# Patient Record
Sex: MALE | Race: BLACK OR AFRICAN AMERICAN | NOT HISPANIC OR LATINO | Employment: STUDENT | RURAL
[De-identification: names, ages, dates, MRNs, and addresses within clinical notes are randomized per-mention and may not be internally consistent; named-entity substitution may affect disease eponyms.]

---

## 2021-08-23 ENCOUNTER — HOSPITAL ENCOUNTER (EMERGENCY)
Facility: HOSPITAL | Age: 31
Discharge: HOME | End: 2021-08-23
Attending: FAMILY MEDICINE
Payer: MEDICAID

## 2021-08-23 VITALS
BODY MASS INDEX: 20.88 KG/M2 | HEART RATE: 63 BPM | SYSTOLIC BLOOD PRESSURE: 136 MMHG | HEIGHT: 67 IN | WEIGHT: 133 LBS | TEMPERATURE: 98 F | OXYGEN SATURATION: 99 % | RESPIRATION RATE: 16 BRPM | DIASTOLIC BLOOD PRESSURE: 84 MMHG

## 2021-08-23 DIAGNOSIS — L30.9 ECZEMA, UNSPECIFIED TYPE: Primary | ICD-10-CM

## 2021-08-23 PROCEDURE — 96372 THER/PROPH/DIAG INJ SC/IM: CPT

## 2021-08-23 PROCEDURE — 99283 PR EMERGENCY DEPT VISIT,LEVEL III: ICD-10-PCS | Mod: ,,, | Performed by: FAMILY MEDICINE

## 2021-08-23 PROCEDURE — 99284 EMERGENCY DEPT VISIT MOD MDM: CPT

## 2021-08-23 PROCEDURE — 99283 EMERGENCY DEPT VISIT LOW MDM: CPT | Mod: ,,, | Performed by: FAMILY MEDICINE

## 2021-08-23 PROCEDURE — 63600175 PHARM REV CODE 636 W HCPCS: Performed by: FAMILY MEDICINE

## 2021-08-23 RX ORDER — DEXAMETHASONE SODIUM PHOSPHATE 4 MG/ML
8 INJECTION, SOLUTION INTRA-ARTICULAR; INTRALESIONAL; INTRAMUSCULAR; INTRAVENOUS; SOFT TISSUE
Status: COMPLETED | OUTPATIENT
Start: 2021-08-23 | End: 2021-08-23

## 2021-08-23 RX ORDER — HYDROCORTISONE 25 MG/G
OINTMENT TOPICAL 2 TIMES DAILY
Qty: 1 TUBE | Refills: 0 | Status: SHIPPED | OUTPATIENT
Start: 2021-08-23 | End: 2022-10-10

## 2021-08-23 RX ORDER — HYDROXYZINE PAMOATE 25 MG/1
25 CAPSULE ORAL EVERY 6 HOURS PRN
Qty: 20 CAPSULE | Refills: 0 | Status: SHIPPED | OUTPATIENT
Start: 2021-08-23 | End: 2022-10-10

## 2021-08-23 RX ADMIN — DEXAMETHASONE SODIUM PHOSPHATE 8 MG: 4 INJECTION, SOLUTION INTRAMUSCULAR; INTRAVENOUS at 02:08

## 2022-07-05 ENCOUNTER — OFFICE VISIT (OUTPATIENT)
Dept: FAMILY MEDICINE | Facility: CLINIC | Age: 32
End: 2022-07-05
Payer: MEDICAID

## 2022-07-05 VITALS
DIASTOLIC BLOOD PRESSURE: 80 MMHG | OXYGEN SATURATION: 99 % | TEMPERATURE: 98 F | WEIGHT: 135.63 LBS | SYSTOLIC BLOOD PRESSURE: 117 MMHG | HEART RATE: 63 BPM | BODY MASS INDEX: 21.29 KG/M2 | HEIGHT: 67 IN | RESPIRATION RATE: 16 BRPM

## 2022-07-05 DIAGNOSIS — Z72.51 HIGH RISK HETEROSEXUAL BEHAVIOR: Primary | ICD-10-CM

## 2022-07-05 DIAGNOSIS — R36.9 PENILE DISCHARGE: ICD-10-CM

## 2022-07-05 DIAGNOSIS — A54.9 GONORRHEA: ICD-10-CM

## 2022-07-05 DIAGNOSIS — Z11.3 SCREENING EXAMINATION FOR VENEREAL DISEASE: ICD-10-CM

## 2022-07-05 LAB
HIV 1+O+2 AB SERPL QL: NORMAL
SYPHILIS AB INTERPRETATION: REACTIVE

## 2022-07-05 PROCEDURE — 1159F PR MEDICATION LIST DOCUMENTED IN MEDICAL RECORD: ICD-10-PCS | Mod: CPTII,,, | Performed by: NURSE PRACTITIONER

## 2022-07-05 PROCEDURE — 86695 HERPES SIMPLEX 1 & 2 IGG: ICD-10-PCS | Mod: ,,, | Performed by: CLINICAL MEDICAL LABORATORY

## 2022-07-05 PROCEDURE — 96372 THER/PROPH/DIAG INJ SC/IM: CPT | Mod: ,,, | Performed by: NURSE PRACTITIONER

## 2022-07-05 PROCEDURE — 87591 CHLAMYDIA/GONORRHOEAE(GC), PCR: ICD-10-PCS | Mod: ,,, | Performed by: CLINICAL MEDICAL LABORATORY

## 2022-07-05 PROCEDURE — 3008F BODY MASS INDEX DOCD: CPT | Mod: CPTII,,, | Performed by: NURSE PRACTITIONER

## 2022-07-05 PROCEDURE — 99214 OFFICE O/P EST MOD 30 MIN: CPT | Mod: 25,,, | Performed by: NURSE PRACTITIONER

## 2022-07-05 PROCEDURE — 86696 HERPES SIMPLEX 1 & 2 IGG: ICD-10-PCS | Mod: ,,, | Performed by: CLINICAL MEDICAL LABORATORY

## 2022-07-05 PROCEDURE — 3074F SYST BP LT 130 MM HG: CPT | Mod: CPTII,,, | Performed by: NURSE PRACTITIONER

## 2022-07-05 PROCEDURE — 86780 TREPONEMA PALLIDUM (SYPHILIS) ANTIBODY: ICD-10-PCS | Mod: ,,, | Performed by: CLINICAL MEDICAL LABORATORY

## 2022-07-05 PROCEDURE — 86695 HERPES SIMPLEX TYPE 1 TEST: CPT | Mod: ,,, | Performed by: CLINICAL MEDICAL LABORATORY

## 2022-07-05 PROCEDURE — 86780 TREPONEMA PALLIDUM: CPT | Mod: ,,, | Performed by: CLINICAL MEDICAL LABORATORY

## 2022-07-05 PROCEDURE — 86592 RPR: ICD-10-PCS | Mod: ,,, | Performed by: CLINICAL MEDICAL LABORATORY

## 2022-07-05 PROCEDURE — 87491 CHLMYD TRACH DNA AMP PROBE: CPT | Mod: 91,,, | Performed by: CLINICAL MEDICAL LABORATORY

## 2022-07-05 PROCEDURE — 86694 HERPES SIMPLEX 1 & 2 IGM: ICD-10-PCS | Mod: ,,, | Performed by: CLINICAL MEDICAL LABORATORY

## 2022-07-05 PROCEDURE — 86592 SYPHILIS TEST NON-TREP QUAL: CPT | Mod: ,,, | Performed by: CLINICAL MEDICAL LABORATORY

## 2022-07-05 PROCEDURE — 86694 HERPES SIMPLEX NES ANTBDY: CPT | Mod: ,,, | Performed by: CLINICAL MEDICAL LABORATORY

## 2022-07-05 PROCEDURE — 87389 HIV 1 / 2 ANTIBODY: ICD-10-PCS | Mod: ,,, | Performed by: CLINICAL MEDICAL LABORATORY

## 2022-07-05 PROCEDURE — 3008F PR BODY MASS INDEX (BMI) DOCUMENTED: ICD-10-PCS | Mod: CPTII,,, | Performed by: NURSE PRACTITIONER

## 2022-07-05 PROCEDURE — 87661 TRICHOMONAS VAGINALIS BY PCR: ICD-10-PCS | Mod: ,,, | Performed by: CLINICAL MEDICAL LABORATORY

## 2022-07-05 PROCEDURE — 3074F PR MOST RECENT SYSTOLIC BLOOD PRESSURE < 130 MM HG: ICD-10-PCS | Mod: CPTII,,, | Performed by: NURSE PRACTITIONER

## 2022-07-05 PROCEDURE — 87661 TRICHOMONAS VAGINALIS AMPLIF: CPT | Mod: ,,, | Performed by: CLINICAL MEDICAL LABORATORY

## 2022-07-05 PROCEDURE — 87491 CHLAMYDIA/GONORRHOEAE(GC), PCR: ICD-10-PCS | Mod: 91,,, | Performed by: CLINICAL MEDICAL LABORATORY

## 2022-07-05 PROCEDURE — 3079F DIAST BP 80-89 MM HG: CPT | Mod: CPTII,,, | Performed by: NURSE PRACTITIONER

## 2022-07-05 PROCEDURE — 3079F PR MOST RECENT DIASTOLIC BLOOD PRESSURE 80-89 MM HG: ICD-10-PCS | Mod: CPTII,,, | Performed by: NURSE PRACTITIONER

## 2022-07-05 PROCEDURE — 99214 PR OFFICE/OUTPT VISIT, EST, LEVL IV, 30-39 MIN: ICD-10-PCS | Mod: 25,,, | Performed by: NURSE PRACTITIONER

## 2022-07-05 PROCEDURE — 87591 N.GONORRHOEAE DNA AMP PROB: CPT | Mod: ,,, | Performed by: CLINICAL MEDICAL LABORATORY

## 2022-07-05 PROCEDURE — 86696 HERPES SIMPLEX TYPE 2 TEST: CPT | Mod: ,,, | Performed by: CLINICAL MEDICAL LABORATORY

## 2022-07-05 PROCEDURE — 87389 HIV-1 AG W/HIV-1&-2 AB AG IA: CPT | Mod: ,,, | Performed by: CLINICAL MEDICAL LABORATORY

## 2022-07-05 PROCEDURE — 1159F MED LIST DOCD IN RCRD: CPT | Mod: CPTII,,, | Performed by: NURSE PRACTITIONER

## 2022-07-05 PROCEDURE — 96372 PR INJECTION,THERAP/PROPH/DIAG2ST, IM OR SUBCUT: ICD-10-PCS | Mod: ,,, | Performed by: NURSE PRACTITIONER

## 2022-07-05 RX ORDER — CEFTRIAXONE 1 G/1
1 INJECTION, POWDER, FOR SOLUTION INTRAMUSCULAR; INTRAVENOUS
Status: COMPLETED | OUTPATIENT
Start: 2022-07-05 | End: 2022-07-05

## 2022-07-05 RX ORDER — AZITHROMYCIN 500 MG/1
1000 TABLET, FILM COATED ORAL ONCE
Qty: 2 TABLET | Refills: 0 | Status: SHIPPED | OUTPATIENT
Start: 2022-07-05 | End: 2022-07-05

## 2022-07-05 RX ADMIN — CEFTRIAXONE 1 G: 1 INJECTION, POWDER, FOR SOLUTION INTRAMUSCULAR; INTRAVENOUS at 11:07

## 2022-07-05 NOTE — PROGRESS NOTES
Subjective:       Patient ID: Ney Russell is a 31 y.o. male.    Chief Complaint: Penile Discharge (Patient present to clinic with dysuria and penile discharge for over a wk. )    Green penile discharge    Review of Systems   Constitutional: Negative for chills, fatigue and fever.   Genitourinary: Positive for discharge and dysuria. Negative for flank pain, frequency, genital sores and urgency.         Objective:      Physical Exam  Constitutional:       General: He is not in acute distress.     Appearance: Normal appearance. He is not ill-appearing.   Cardiovascular:      Rate and Rhythm: Normal rate and regular rhythm.      Pulses: Normal pulses.      Heart sounds: Normal heart sounds. No murmur heard.  Pulmonary:      Effort: Pulmonary effort is normal.      Breath sounds: Normal breath sounds. No wheezing or rhonchi.   Abdominal:      General: Bowel sounds are normal.      Palpations: Abdomen is soft.      Tenderness: There is no abdominal tenderness. There is no right CVA tenderness, left CVA tenderness, guarding or rebound.   Musculoskeletal:         General: No swelling or tenderness. Normal range of motion.   Skin:     General: Skin is warm and dry.      Capillary Refill: Capillary refill takes less than 2 seconds.      Findings: No lesion or rash.   Neurological:      Mental Status: He is alert and oriented to person, place, and time.   Psychiatric:         Mood and Affect: Mood normal.         Behavior: Behavior normal.         Thought Content: Thought content normal.         Judgment: Judgment normal.         No visits with results within 6 Month(s) from this visit.   Latest known visit with results is:   No results found for any previous visit.      Assessment:       1. High risk heterosexual behavior    2. Penile discharge    3. Screening examination for venereal disease        Plan:   High risk heterosexual behavior  -     Chlamydia/GC, PCR; Future; Expected date: 07/05/2022    Penile discharge  -      HIV 1/2 Ag/Ab (4th Gen); Future; Expected date: 07/05/2022  -     Herpes simplex type 1&2 IgG,Herpes titer; Future; Expected date: 07/05/2022  -     Herpes simplex type 1 & 2 IgM,Herpes IgM; Future; Expected date: 07/05/2022  -     Syphilis Antibody with reflex to RPR; Future; Expected date: 07/05/2022  -     Chlamydia/GC, PCR; Future; Expected date: 07/05/2022  -     Trichomonas vaginalis by PCR; Future; Expected date: 07/05/2022  -     azithromycin (ZITHROMAX) 500 MG tablet; Take 2 tablets (1,000 mg total) by mouth once. for 1 dose  Dispense: 2 tablet; Refill: 0    Screening examination for venereal disease  -     HIV 1/2 Ag/Ab (4th Gen); Future; Expected date: 07/05/2022  -     Herpes simplex type 1&2 IgG,Herpes titer; Future; Expected date: 07/05/2022  -     Herpes simplex type 1 & 2 IgM,Herpes IgM; Future; Expected date: 07/05/2022  -     Syphilis Antibody with reflex to RPR; Future; Expected date: 07/05/2022  -     Chlamydia/GC, PCR; Future; Expected date: 07/05/2022  -     Trichomonas vaginalis by PCR; Future; Expected date: 07/05/2022    Other orders  -     cefTRIAXone injection 1 g

## 2022-07-06 LAB
CHLAMYDIA BY PCR: NEGATIVE
N. GONORRHOEAE (GC) BY PCR: POSITIVE
RPR SER-TITR: ABNORMAL {TITER}

## 2022-07-06 RX ORDER — AZITHROMYCIN 500 MG/1
1000 TABLET, FILM COATED ORAL ONCE
Qty: 2 TABLET | Refills: 0 | Status: SHIPPED | OUTPATIENT
Start: 2022-07-06 | End: 2022-07-06

## 2022-07-06 NOTE — PROGRESS NOTES
Please notify lab I ordered the confirmatory test  and he needs treatment of rocephin 1 gram and I will send in Zithromax PO

## 2022-07-07 LAB
HSV IGM SER QL IA: NEGATIVE
HSV TYPE 1 AB IGG INDEX: 3.84
HSV TYPE 2 AB IGG INDEX: 2.45
HSV1 IGG SER QL: POSITIVE
HSV2 IGG SER QL: POSITIVE
TRICHOMONAS NAT: NEGATIVE

## 2022-07-07 PROCEDURE — 86780 SYPHILIS AB, TP-PA: ICD-10-PCS | Mod: 90,,, | Performed by: CLINICAL MEDICAL LABORATORY

## 2022-07-07 PROCEDURE — 86780 TREPONEMA PALLIDUM: CPT | Mod: 90,,, | Performed by: CLINICAL MEDICAL LABORATORY

## 2022-07-08 ENCOUNTER — TELEPHONE (OUTPATIENT)
Dept: FAMILY MEDICINE | Facility: CLINIC | Age: 32
End: 2022-07-08
Payer: MEDICAID

## 2022-07-08 NOTE — TELEPHONE ENCOUNTER
Unable to reach pt. Fax demographics and results to [facility].----- Message from KADI Zhang sent at 7/6/2022  9:03 AM CDT -----  Please notify lab I ordered the confirmatory test  and he needs treatment of rocephin 1 gram and I will send in Zithromax PO

## 2022-07-11 LAB — T PALLIDUM AB SER QL AGGL: POSITIVE

## 2022-07-19 ENCOUNTER — CLINICAL SUPPORT (OUTPATIENT)
Dept: FAMILY MEDICINE | Facility: CLINIC | Age: 32
End: 2022-07-19
Payer: MEDICAID

## 2022-07-19 DIAGNOSIS — A53.9 SYPHILIS: Primary | ICD-10-CM

## 2022-07-19 PROCEDURE — 96372 THER/PROPH/DIAG INJ SC/IM: CPT | Mod: ,,, | Performed by: NURSE PRACTITIONER

## 2022-07-19 PROCEDURE — 96372 PR INJECTION,THERAP/PROPH/DIAG2ST, IM OR SUBCUT: ICD-10-PCS | Mod: ,,, | Performed by: NURSE PRACTITIONER

## 2022-07-19 NOTE — PROGRESS NOTES
Pt RTC for results. Treatment was needed for positive Syphilis. Instructed to RTC x 3 weeks of 2.4 Bicillin per Danna Verde's order. Voiced understanding and had no further questions.

## 2022-07-26 ENCOUNTER — OFFICE VISIT (OUTPATIENT)
Dept: FAMILY MEDICINE | Facility: CLINIC | Age: 32
End: 2022-07-26
Payer: MEDICAID

## 2022-07-26 VITALS
HEIGHT: 66 IN | RESPIRATION RATE: 18 BRPM | BODY MASS INDEX: 21.38 KG/M2 | HEART RATE: 68 BPM | SYSTOLIC BLOOD PRESSURE: 148 MMHG | OXYGEN SATURATION: 98 % | WEIGHT: 133 LBS | DIASTOLIC BLOOD PRESSURE: 90 MMHG | TEMPERATURE: 98 F

## 2022-07-26 DIAGNOSIS — A53.9 SYPHILIS: Primary | ICD-10-CM

## 2022-07-26 PROCEDURE — 3077F PR MOST RECENT SYSTOLIC BLOOD PRESSURE >= 140 MM HG: ICD-10-PCS | Mod: CPTII,,, | Performed by: FAMILY MEDICINE

## 2022-07-26 PROCEDURE — 3080F PR MOST RECENT DIASTOLIC BLOOD PRESSURE >= 90 MM HG: ICD-10-PCS | Mod: CPTII,,, | Performed by: FAMILY MEDICINE

## 2022-07-26 PROCEDURE — 96372 THER/PROPH/DIAG INJ SC/IM: CPT | Mod: ,,, | Performed by: FAMILY MEDICINE

## 2022-07-26 PROCEDURE — 99213 PR OFFICE/OUTPT VISIT, EST, LEVL III, 20-29 MIN: ICD-10-PCS | Mod: 25,,, | Performed by: FAMILY MEDICINE

## 2022-07-26 PROCEDURE — 3008F PR BODY MASS INDEX (BMI) DOCUMENTED: ICD-10-PCS | Mod: CPTII,,, | Performed by: FAMILY MEDICINE

## 2022-07-26 PROCEDURE — 99213 OFFICE O/P EST LOW 20 MIN: CPT | Mod: 25,,, | Performed by: FAMILY MEDICINE

## 2022-07-26 PROCEDURE — 1159F PR MEDICATION LIST DOCUMENTED IN MEDICAL RECORD: ICD-10-PCS | Mod: CPTII,,, | Performed by: FAMILY MEDICINE

## 2022-07-26 PROCEDURE — 1159F MED LIST DOCD IN RCRD: CPT | Mod: CPTII,,, | Performed by: FAMILY MEDICINE

## 2022-07-26 PROCEDURE — 96372 PR INJECTION,THERAP/PROPH/DIAG2ST, IM OR SUBCUT: ICD-10-PCS | Mod: ,,, | Performed by: FAMILY MEDICINE

## 2022-07-26 PROCEDURE — 3077F SYST BP >= 140 MM HG: CPT | Mod: CPTII,,, | Performed by: FAMILY MEDICINE

## 2022-07-26 PROCEDURE — 3080F DIAST BP >= 90 MM HG: CPT | Mod: CPTII,,, | Performed by: FAMILY MEDICINE

## 2022-07-26 PROCEDURE — 3008F BODY MASS INDEX DOCD: CPT | Mod: CPTII,,, | Performed by: FAMILY MEDICINE

## 2022-07-26 NOTE — PROGRESS NOTES
Subjective:       Patient ID: Ney Russell is a 31 y.o. male.    Chief Complaint: No chief complaint on file.    In for Bicillin injection.  He receive 1 injection last week.    Review of Systems      Objective:      Physical Exam    Assessment:       Problem List Items Addressed This Visit    None         Plan:         return in 1 week for repeat Bicillin injection.  Repeat titer in 6 weeks

## 2022-08-09 ENCOUNTER — CLINICAL SUPPORT (OUTPATIENT)
Dept: FAMILY MEDICINE | Facility: CLINIC | Age: 32
End: 2022-08-09
Payer: MEDICAID

## 2022-08-09 DIAGNOSIS — A53.9 SYPHILIS: Primary | ICD-10-CM

## 2022-08-09 PROCEDURE — 96372 PR INJECTION,THERAP/PROPH/DIAG2ST, IM OR SUBCUT: ICD-10-PCS | Mod: ,,, | Performed by: FAMILY MEDICINE

## 2022-08-09 PROCEDURE — 96372 THER/PROPH/DIAG INJ SC/IM: CPT | Mod: ,,, | Performed by: FAMILY MEDICINE

## 2022-08-12 ENCOUNTER — HOSPITAL ENCOUNTER (OUTPATIENT)
Dept: RADIOLOGY | Facility: HOSPITAL | Age: 32
Discharge: HOME | End: 2022-08-12
Attending: NURSE PRACTITIONER
Payer: MEDICAID

## 2022-08-12 ENCOUNTER — OFFICE VISIT (OUTPATIENT)
Dept: PRIMARY CARE CLINIC | Facility: CLINIC | Age: 32
End: 2022-08-12
Payer: COMMERCIAL

## 2022-08-12 VITALS
HEIGHT: 66 IN | HEART RATE: 69 BPM | BODY MASS INDEX: 21.38 KG/M2 | SYSTOLIC BLOOD PRESSURE: 127 MMHG | DIASTOLIC BLOOD PRESSURE: 75 MMHG | WEIGHT: 133 LBS | OXYGEN SATURATION: 98 % | TEMPERATURE: 98 F | RESPIRATION RATE: 16 BRPM

## 2022-08-12 DIAGNOSIS — L25.3 CHEMICAL DERMATITIS: ICD-10-CM

## 2022-08-12 DIAGNOSIS — S61.219A LACERATION OF FINGER OF LEFT HAND, INITIAL ENCOUNTER: Primary | ICD-10-CM

## 2022-08-12 PROCEDURE — 99213 OFFICE O/P EST LOW 20 MIN: CPT | Mod: ,,, | Performed by: NURSE PRACTITIONER

## 2022-08-12 PROCEDURE — 73140 X-RAY EXAM OF FINGER(S): CPT | Mod: TC,LT

## 2022-08-12 PROCEDURE — 99213 PR OFFICE/OUTPT VISIT, EST, LEVL III, 20-29 MIN: ICD-10-PCS | Mod: ,,, | Performed by: NURSE PRACTITIONER

## 2022-08-12 PROCEDURE — 73140 XR FINGER 2 OR MORE VIEWS LEFT: ICD-10-PCS | Mod: 26,LT,, | Performed by: RADIOLOGY

## 2022-08-12 PROCEDURE — 73140 X-RAY EXAM OF FINGER(S): CPT | Mod: 26,LT,, | Performed by: RADIOLOGY

## 2022-08-12 RX ORDER — TRIAMCINOLONE ACETONIDE 0.25 MG/G
CREAM TOPICAL 2 TIMES DAILY
Qty: 80 G | Refills: 0 | Status: SHIPPED | OUTPATIENT
Start: 2022-08-12 | End: 2022-11-11
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
## 2022-08-24 ENCOUNTER — OFFICE VISIT (OUTPATIENT)
Dept: PRIMARY CARE CLINIC | Facility: CLINIC | Age: 32
End: 2022-08-24
Payer: COMMERCIAL

## 2022-08-24 VITALS
WEIGHT: 133 LBS | SYSTOLIC BLOOD PRESSURE: 122 MMHG | TEMPERATURE: 99 F | DIASTOLIC BLOOD PRESSURE: 78 MMHG | OXYGEN SATURATION: 99 % | RESPIRATION RATE: 18 BRPM | BODY MASS INDEX: 21.38 KG/M2 | HEIGHT: 66 IN | HEART RATE: 60 BPM

## 2022-08-24 DIAGNOSIS — L25.3 CHEMICAL DERMATITIS: Primary | ICD-10-CM

## 2022-08-24 DIAGNOSIS — S61.213A LACERATION OF LEFT MIDDLE FINGER WITHOUT FOREIGN BODY WITHOUT DAMAGE TO NAIL: Primary | ICD-10-CM

## 2022-08-24 DIAGNOSIS — S61.219D LACERATION OF FINGER OF LEFT HAND, SUBSEQUENT ENCOUNTER: ICD-10-CM

## 2022-08-24 PROCEDURE — 99213 OFFICE O/P EST LOW 20 MIN: CPT | Mod: ,,, | Performed by: NURSE PRACTITIONER

## 2022-08-24 PROCEDURE — 99213 PR OFFICE/OUTPT VISIT, EST, LEVL III, 20-29 MIN: ICD-10-PCS | Mod: ,,, | Performed by: NURSE PRACTITIONER
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
## 2022-10-10 ENCOUNTER — OFFICE VISIT (OUTPATIENT)
Dept: PRIMARY CARE CLINIC | Facility: CLINIC | Age: 32
End: 2022-10-10
Payer: COMMERCIAL

## 2022-10-10 VITALS
DIASTOLIC BLOOD PRESSURE: 82 MMHG | TEMPERATURE: 98 F | WEIGHT: 133 LBS | HEIGHT: 66 IN | HEART RATE: 60 BPM | OXYGEN SATURATION: 98 % | SYSTOLIC BLOOD PRESSURE: 132 MMHG | BODY MASS INDEX: 21.38 KG/M2 | RESPIRATION RATE: 18 BRPM

## 2022-10-10 DIAGNOSIS — R21 RASH: ICD-10-CM

## 2022-10-10 DIAGNOSIS — S61.219D LACERATION OF FINGER OF LEFT HAND, SUBSEQUENT ENCOUNTER: Primary | ICD-10-CM

## 2022-10-10 PROCEDURE — 99213 OFFICE O/P EST LOW 20 MIN: CPT | Mod: ,,, | Performed by: NURSE PRACTITIONER

## 2022-10-10 PROCEDURE — 99213 PR OFFICE/OUTPT VISIT, EST, LEVL III, 20-29 MIN: ICD-10-PCS | Mod: ,,, | Performed by: NURSE PRACTITIONER

## 2022-10-10 RX ORDER — HYDROCORTISONE 25 MG/G
CREAM TOPICAL 2 TIMES DAILY
Qty: 20 G | Refills: 0 | Status: SHIPPED | OUTPATIENT
Start: 2022-10-10 | End: 2022-11-11
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
## 2022-11-11 ENCOUNTER — OFFICE VISIT (OUTPATIENT)
Dept: FAMILY MEDICINE | Facility: CLINIC | Age: 32
End: 2022-11-11

## 2022-11-11 VITALS
DIASTOLIC BLOOD PRESSURE: 95 MMHG | HEART RATE: 64 BPM | BODY MASS INDEX: 21.69 KG/M2 | HEIGHT: 66 IN | WEIGHT: 135 LBS | RESPIRATION RATE: 17 BRPM | SYSTOLIC BLOOD PRESSURE: 121 MMHG | OXYGEN SATURATION: 100 % | TEMPERATURE: 98 F

## 2022-11-11 DIAGNOSIS — B35.4 TINEA CORPORIS: ICD-10-CM

## 2022-11-11 DIAGNOSIS — B35.0 TINEA CAPITIS: Primary | ICD-10-CM

## 2022-11-11 PROCEDURE — 99213 PR OFFICE/OUTPT VISIT, EST, LEVL III, 20-29 MIN: ICD-10-PCS | Mod: ,,, | Performed by: NURSE PRACTITIONER

## 2022-11-11 PROCEDURE — 99213 OFFICE O/P EST LOW 20 MIN: CPT | Mod: ,,, | Performed by: NURSE PRACTITIONER

## 2022-11-11 RX ORDER — TERBINAFINE HYDROCHLORIDE 250 MG/1
250 TABLET ORAL DAILY
Qty: 30 TABLET | Refills: 0 | Status: SHIPPED | OUTPATIENT
Start: 2022-11-11 | End: 2022-12-11

## 2022-11-11 RX ORDER — HYDROXYZINE PAMOATE 25 MG/1
25 CAPSULE ORAL EVERY 4 HOURS PRN
Qty: 30 CAPSULE | Refills: 0 | Status: SHIPPED | OUTPATIENT
Start: 2022-11-11

## 2022-11-11 NOTE — PROGRESS NOTES
Subjective:       Patient ID: Ney Russell is a 31 y.o. male.    Chief Complaint: Rash (Allergic reaction. Rash all over. Second this happen.)    Widespread rash over body and scalp- hx of the same    This is a recurring problem. The current episode started 1 week ago. The problem has been gradually worsening since onset. He states that it started on his scalp and made its way down the body. The affected locations include the scalp, chest, back, left arm, right arm, left upper leg, left buttock, left elbow, right elbow, right upper leg, right buttock, left axilla and right axilla. The rash is characterized by dryness, scaling, peeling and itchiness. He was exposed to nothing. Pertinent negatives include no congestion, cough, diarrhea, fatigue, fever, nail changes, rhinorrhea, shortness of breath, sore throat or vomiting. Past treatments include hydrocortisone, vistaril, terbinafine and steroids that helped in the past.    Rash  This is a new problem. The current episode started in the past 7 days. The problem has been gradually worsening since onset. The affected locations include the scalp, head, abdomen, right arm and left arm. Pertinent negatives include no anorexia, congestion, cough, diarrhea, eye pain, facial edema, fatigue, fever, joint pain, nail changes, rhinorrhea, shortness of breath, sore throat or vomiting. Past treatments include topical steroids and anti-itch cream. The treatment provided no relief. His past medical history is significant for eczema. There is no history of allergies, asthma or varicella.   Review of Systems   Constitutional:  Negative for appetite change, chills, fatigue and fever.   HENT:  Negative for nasal congestion, ear pain, rhinorrhea and sore throat.    Eyes:  Negative for pain, discharge and itching.   Respiratory:  Negative for cough and shortness of breath.    Cardiovascular:  Negative for chest pain and leg swelling.   Gastrointestinal:  Negative for abdominal pain,  anorexia, change in bowel habit, diarrhea, nausea, vomiting and change in bowel habit.   Genitourinary:  Negative for dysuria, flank pain, frequency, genital sores and urgency.   Musculoskeletal:  Negative for back pain, gait problem, joint pain and neck pain.   Integumentary:  Positive for rash. Negative for nail changes and wound.   Neurological:  Negative for dizziness, weakness and headaches.   All other systems reviewed and are negative.      Objective:      Physical Exam  Vitals and nursing note reviewed.   Constitutional:       General: He is not in acute distress.     Appearance: Normal appearance. He is not ill-appearing, toxic-appearing or diaphoretic.   HENT:      Head: Normocephalic.      Mouth/Throat:      Mouth: Mucous membranes are moist.   Eyes:      General: No scleral icterus.        Right eye: No discharge.         Left eye: No discharge.      Extraocular Movements: Extraocular movements intact.      Pupils: Pupils are equal, round, and reactive to light.   Cardiovascular:      Rate and Rhythm: Normal rate and regular rhythm.      Pulses: Normal pulses.      Heart sounds: Normal heart sounds. No murmur heard.  Pulmonary:      Effort: Pulmonary effort is normal. No respiratory distress.      Breath sounds: Normal breath sounds. No wheezing, rhonchi or rales.   Musculoskeletal:         General: Normal range of motion.      Cervical back: Neck supple. No tenderness.   Lymphadenopathy:      Cervical: No cervical adenopathy.   Skin:     General: Skin is warm and dry.      Capillary Refill: Capillary refill takes less than 2 seconds.      Findings: Rash present. Rash is scaling.      Comments: Multiple flat, dry, scaling, pruritic lesions noted to trunks, scalp, face arms and legs   Neurological:      Mental Status: He is alert and oriented to person, place, and time.   Psychiatric:         Mood and Affect: Mood normal.         Behavior: Behavior normal.         Thought Content: Thought content normal.          Judgment: Judgment normal.          Assessment:       1. Tinea capitis    2. Tinea corporis        Plan:   Tinea capitis  -     terbinafine HCL (LAMISIL) 250 mg tablet; Take 1 tablet (250 mg total) by mouth once daily.  Dispense: 30 tablet; Refill: 0  -     hydrOXYzine pamoate (VISTARIL) 25 MG Cap; Take 1 capsule (25 mg total) by mouth every 4 (four) hours as needed (itching).  Dispense: 30 capsule; Refill: 0    Tinea corporis  -     terbinafine HCL (LAMISIL) 250 mg tablet; Take 1 tablet (250 mg total) by mouth once daily.  Dispense: 30 tablet; Refill: 0  -     hydrOXYzine pamoate (VISTARIL) 25 MG Cap; Take 1 capsule (25 mg total) by mouth every 4 (four) hours as needed (itching).  Dispense: 30 capsule; Refill: 0  If symptoms persists I will refer to derm

## 2023-04-19 ENCOUNTER — PATIENT MESSAGE (OUTPATIENT)
Dept: RESEARCH | Facility: HOSPITAL | Age: 33
End: 2023-04-19

## 2024-01-15 ENCOUNTER — OFFICE VISIT (OUTPATIENT)
Dept: FAMILY MEDICINE | Facility: CLINIC | Age: 34
End: 2024-01-15

## 2024-01-15 VITALS
SYSTOLIC BLOOD PRESSURE: 118 MMHG | TEMPERATURE: 98 F | HEART RATE: 63 BPM | OXYGEN SATURATION: 93 % | HEIGHT: 66 IN | BODY MASS INDEX: 23.46 KG/M2 | DIASTOLIC BLOOD PRESSURE: 80 MMHG | WEIGHT: 146 LBS | RESPIRATION RATE: 18 BRPM

## 2024-01-15 DIAGNOSIS — Z11.3 SCREENING EXAMINATION FOR VENEREAL DISEASE: Primary | ICD-10-CM

## 2024-01-15 DIAGNOSIS — B00.9 HSV-2 (HERPES SIMPLEX VIRUS 2) INFECTION: ICD-10-CM

## 2024-01-15 DIAGNOSIS — R36.9 PENILE DISCHARGE: ICD-10-CM

## 2024-01-15 LAB
HAV IGM SER QL: NORMAL
HBV CORE IGM SER QL: NORMAL
HBV SURFACE AG SERPL QL IA: NORMAL
HCV AB SER QL: NORMAL
HIV 1+O+2 AB SERPL QL: NORMAL
RPR SER-TITR: NORMAL {TITER}
SYPHILIS AB INTERPRETATION: REACTIVE

## 2024-01-15 PROCEDURE — 96372 THER/PROPH/DIAG INJ SC/IM: CPT | Mod: ,,, | Performed by: NURSE PRACTITIONER

## 2024-01-15 PROCEDURE — 80074 ACUTE HEPATITIS PANEL: CPT | Mod: ,,, | Performed by: CLINICAL MEDICAL LABORATORY

## 2024-01-15 PROCEDURE — 87591 N.GONORRHOEAE DNA AMP PROB: CPT | Mod: ,,, | Performed by: CLINICAL MEDICAL LABORATORY

## 2024-01-15 PROCEDURE — 86780 TREPONEMA PALLIDUM: CPT | Mod: ,,, | Performed by: CLINICAL MEDICAL LABORATORY

## 2024-01-15 PROCEDURE — 87661 TRICHOMONAS VAGINALIS AMPLIF: CPT | Mod: ,,, | Performed by: CLINICAL MEDICAL LABORATORY

## 2024-01-15 PROCEDURE — 99214 OFFICE O/P EST MOD 30 MIN: CPT | Mod: 25,,, | Performed by: NURSE PRACTITIONER

## 2024-01-15 PROCEDURE — 86592 SYPHILIS TEST NON-TREP QUAL: CPT | Mod: ,,, | Performed by: CLINICAL MEDICAL LABORATORY

## 2024-01-15 PROCEDURE — 87389 HIV-1 AG W/HIV-1&-2 AB AG IA: CPT | Mod: ,,, | Performed by: CLINICAL MEDICAL LABORATORY

## 2024-01-15 PROCEDURE — 87491 CHLMYD TRACH DNA AMP PROBE: CPT | Mod: ,,, | Performed by: CLINICAL MEDICAL LABORATORY

## 2024-01-15 RX ORDER — AZITHROMYCIN 500 MG/1
1000 TABLET, FILM COATED ORAL ONCE
Qty: 2 TABLET | Refills: 0 | Status: SHIPPED | OUTPATIENT
Start: 2024-01-15 | End: 2024-01-15

## 2024-01-15 RX ORDER — CEFTRIAXONE 1 G/1
1 INJECTION, POWDER, FOR SOLUTION INTRAMUSCULAR; INTRAVENOUS
Status: COMPLETED | OUTPATIENT
Start: 2024-01-15 | End: 2024-01-15

## 2024-01-15 RX ORDER — VALACYCLOVIR HYDROCHLORIDE 1 G/1
1000 TABLET, FILM COATED ORAL 2 TIMES DAILY
Qty: 60 TABLET | Refills: 11 | Status: SHIPPED | OUTPATIENT
Start: 2024-01-15 | End: 2025-01-14

## 2024-01-15 RX ADMIN — CEFTRIAXONE 1 G: 1 INJECTION, POWDER, FOR SOLUTION INTRAMUSCULAR; INTRAVENOUS at 02:01

## 2024-01-15 NOTE — PROGRESS NOTES
Subjective:       Patient ID: Ney Russell is a 33 y.o. male.    Chief Complaint: Penile Discharge (Pt. States discharge started 1/8/24)    Penile Discharge (Pt. States discharge started 1/8/24)  Genital rash- hx of HSV 1&2- needs valtrex refilled      Penile Discharge  The patient's primary symptoms include penile discharge. Pertinent negatives include no chills, dysuria, fever, flank pain, frequency or urgency.     Review of Systems   Constitutional:  Negative for chills, fatigue and fever.   Genitourinary:  Positive for discharge and genital sores. Negative for dysuria, flank pain, frequency and urgency.         Objective:      Physical Exam  Vitals and nursing note reviewed.   Constitutional:       General: He is not in acute distress.     Appearance: Normal appearance. He is not ill-appearing, toxic-appearing or diaphoretic.   HENT:      Head: Normocephalic.   Eyes:      General: No scleral icterus.     Extraocular Movements: Extraocular movements intact.      Pupils: Pupils are equal, round, and reactive to light.   Cardiovascular:      Rate and Rhythm: Normal rate and regular rhythm.      Pulses: Normal pulses.      Heart sounds: Normal heart sounds. No murmur heard.  Pulmonary:      Effort: Pulmonary effort is normal. No respiratory distress.      Breath sounds: Normal breath sounds. No wheezing, rhonchi or rales.   Abdominal:      General: Bowel sounds are normal.      Palpations: Abdomen is soft.      Tenderness: There is no abdominal tenderness. There is no right CVA tenderness, left CVA tenderness, guarding or rebound.   Musculoskeletal:         General: Normal range of motion.      Cervical back: Neck supple.   Skin:     General: Skin is warm and dry.      Capillary Refill: Capillary refill takes less than 2 seconds.      Findings: Rash present. No lesion.      Comments: Multiple erythematous lesion in the genital area- hx of HSV 1&2   Neurological:      Mental Status: He is alert and oriented to  person, place, and time.   Psychiatric:         Mood and Affect: Mood normal.         Behavior: Behavior normal.         Thought Content: Thought content normal.         Judgment: Judgment normal.          Assessment:       1. Screening examination for venereal disease    2. Penile discharge    3. HSV-2 (herpes simplex virus 2) infection        Plan:   Screening examination for venereal disease  -     HIV 1/2 Ag/Ab (4th Gen); Future; Expected date: 01/15/2024  -     Hepatitis panel, acute; Future; Expected date: 01/15/2024  -     Syphilis Antibody with reflex to RPR; Future; Expected date: 01/15/2024  -     Trichomonas vaginalis by PCR; Future; Expected date: 01/15/2024  -     Chlamydia/GC, PCR; Future; Expected date: 01/15/2024  -     POCT URINALYSIS W/O SCOPE    Penile discharge  -     cefTRIAXone injection 1 g  -     azithromycin (ZITHROMAX) 500 MG tablet; Take 2 tablets (1,000 mg total) by mouth once. for 1 dose  Dispense: 2 tablet; Refill: 0    HSV-2 (herpes simplex virus 2) infection  -     valACYclovir (VALTREX) 1000 MG tablet; Take 1 tablet (1,000 mg total) by mouth 2 (two) times daily.  Dispense: 60 tablet; Refill: 11           Risks, benefits, and side effects were discussed with the patient. All questions were answered to the fullest satisfaction of the patient, and pt verbalized understanding and agreement to treatment plan. Pt was to call with any new or worsening symptoms, or present to the ER

## 2024-01-17 LAB
CHLAMYDIA BY PCR: NEGATIVE
N. GONORRHOEAE (GC) BY PCR: POSITIVE
TRICHOMONAS NAT: NEGATIVE

## 2024-04-22 ENCOUNTER — HOSPITAL ENCOUNTER (EMERGENCY)
Facility: HOSPITAL | Age: 34
Discharge: HOME | End: 2024-04-22

## 2024-04-22 VITALS
OXYGEN SATURATION: 97 % | RESPIRATION RATE: 18 BRPM | BODY MASS INDEX: 21.03 KG/M2 | TEMPERATURE: 98 F | HEIGHT: 67 IN | WEIGHT: 134 LBS | DIASTOLIC BLOOD PRESSURE: 84 MMHG | HEART RATE: 55 BPM | SYSTOLIC BLOOD PRESSURE: 137 MMHG

## 2024-04-22 DIAGNOSIS — J20.9 ACUTE BRONCHITIS, UNSPECIFIED ORGANISM: Primary | ICD-10-CM

## 2024-04-22 DIAGNOSIS — R05.9 COUGH: ICD-10-CM

## 2024-04-22 PROCEDURE — 99284 EMERGENCY DEPT VISIT MOD MDM: CPT | Mod: ,,, | Performed by: NURSE PRACTITIONER

## 2024-04-22 PROCEDURE — 63600175 PHARM REV CODE 636 W HCPCS: Performed by: NURSE PRACTITIONER

## 2024-04-22 PROCEDURE — 96372 THER/PROPH/DIAG INJ SC/IM: CPT | Performed by: NURSE PRACTITIONER

## 2024-04-22 PROCEDURE — 25000242 PHARM REV CODE 250 ALT 637 W/ HCPCS: Performed by: NURSE PRACTITIONER

## 2024-04-22 PROCEDURE — 99284 EMERGENCY DEPT VISIT MOD MDM: CPT | Mod: 25

## 2024-04-22 RX ORDER — ALBUTEROL SULFATE 90 UG/1
4 AEROSOL, METERED RESPIRATORY (INHALATION)
Status: COMPLETED | OUTPATIENT
Start: 2024-04-22 | End: 2024-04-22

## 2024-04-22 RX ORDER — PREDNISONE 50 MG/1
50 TABLET ORAL DAILY
Qty: 5 TABLET | Refills: 0 | Status: SHIPPED | OUTPATIENT
Start: 2024-04-22 | End: 2024-04-27

## 2024-04-22 RX ORDER — ALBUTEROL SULFATE 90 UG/1
2 AEROSOL, METERED RESPIRATORY (INHALATION) EVERY 4 HOURS PRN
Qty: 8 G | Refills: 0 | Status: SHIPPED | OUTPATIENT
Start: 2024-04-22

## 2024-04-22 RX ORDER — DEXAMETHASONE SODIUM PHOSPHATE 4 MG/ML
8 INJECTION, SOLUTION INTRA-ARTICULAR; INTRALESIONAL; INTRAMUSCULAR; INTRAVENOUS; SOFT TISSUE
Status: COMPLETED | OUTPATIENT
Start: 2024-04-22 | End: 2024-04-22

## 2024-04-22 RX ADMIN — DEXAMETHASONE SODIUM PHOSPHATE 8 MG: 4 INJECTION, SOLUTION INTRA-ARTICULAR; INTRALESIONAL; INTRAMUSCULAR; INTRAVENOUS; SOFT TISSUE at 02:04

## 2024-04-22 RX ADMIN — ALBUTEROL SULFATE 4 PUFF: 108 INHALANT RESPIRATORY (INHALATION) at 01:04

## 2024-04-22 NOTE — ED TRIAGE NOTES
Presents to ED for complaints of shortness of breath for 3 weeks.  Patient reports coughing when he takes a deep breath.  Patient reports that he works in cold temperatures at the [employer].

## 2024-04-22 NOTE — DISCHARGE INSTRUCTIONS
Take prednisone daily as directed.  Use your albuterol inhaler 2 puffs every 4 hours for the next 2 days then every 4-6 hours as needed.  Follow up with your primary care provider in 2 days. Return to the emergency department for any increase in symptoms or for any other new or worrisome symptoms.

## 2024-04-22 NOTE — ED PROVIDER NOTES
Encounter Date: 4/22/2024       History     Chief Complaint   Patient presents with    Shortness of Breath     33-year-old male presents to the emergency department to be evaluated for shortness of breath and cough that began 3 weeks ago.    The history is provided by the patient.   Shortness of Breath  This is a new problem. Associated symptoms include cough and wheezing. Pertinent negatives include no fever, no headaches, no rhinorrhea, no sore throat, no swollen glands, no ear pain, no neck pain, no hemoptysis, no PND, no orthopnea, no chest pain, no syncope, no vomiting, no abdominal pain, no rash, no leg pain, no leg swelling and no claudication.     Review of patient's allergies indicates:  No Known Allergies  No past medical history on file.  No past surgical history on file.  No family history on file.  Social History     Tobacco Use    Smoking status: Never    Smokeless tobacco: Never     Review of Systems   Constitutional:  Negative for chills and fever.   HENT:  Negative for ear pain, rhinorrhea and sore throat.    Respiratory:  Positive for cough, shortness of breath and wheezing. Negative for hemoptysis.    Cardiovascular:  Negative for chest pain, orthopnea, claudication, leg swelling, syncope and PND.   Gastrointestinal:  Negative for abdominal pain and vomiting.   Musculoskeletal:  Negative for neck pain.   Skin:  Negative for rash.   Neurological:  Negative for headaches.   All other systems reviewed and are negative.      Physical Exam     Initial Vitals [04/22/24 1120]   BP Pulse Resp Temp SpO2   137/84 67 18 98.3 °F (36.8 °C) 99 %      MAP       --         Physical Exam    Vitals reviewed.  Constitutional: He appears well-developed and well-nourished.   Neck: Neck supple.   Cardiovascular:  Normal rate and regular rhythm.           Pulmonary/Chest: He has wheezes (Minimal expiratory wheeze).   Abdominal: Abdomen is soft. Bowel sounds are normal. He exhibits no distension and no mass. There is no  abdominal tenderness. There is no rebound and no guarding.   Musculoskeletal:         General: Normal range of motion.      Cervical back: Neck supple.     Neurological: He is alert and oriented to person, place, and time. He has normal strength. GCS score is 15. GCS eye subscore is 4. GCS verbal subscore is 5. GCS motor subscore is 6.   Skin: Skin is warm and dry. Capillary refill takes less than 2 seconds.   Psychiatric: He has a normal mood and affect.         Medical Screening Exam   See Full Note    ED Course   Procedures  Labs Reviewed - No data to display       Imaging Results              X-Ray Chest PA And Lateral (Final result)  Result time 04/22/24 12:20:25      Final result by Roshan Dougherty II, MD (04/22/24 12:20:25)                   Impression:      No evidence of cardiopulmonary disease.      Electronically signed by: Roshan Dougherty  Date:    04/22/2024  Time:    12:20               Narrative:    EXAMINATION:  XR CHEST PA AND LATERAL    CLINICAL HISTORY:  Cough, unspecified    COMPARISON:  None available    TECHNIQUE:  XR CHEST PA AND LATERAL    FINDINGS:  The heart and mediastinum are normal in size and configuration.  The pulmonary vascularity is normal in caliber.  No lung infiltrates, effusions, pneumothorax or other abnormality is demonstrated.                                       Medications   dexAMETHasone injection 8 mg (has no administration in time range)   albuterol inhaler 4 puff (4 puffs Inhalation Given 4/22/24 7963)     Medical Decision Making  33-year-old male presents to the emergency department to be evaluated for shortness of breath and cough that began 3 weeks ago.  X-rays ordered, films reviewed as well as the radiologist's interpretation significant for no acute process  Treated with albuterol.  Continued to have some mild wheezing.  Treated with Decadron.  Diagnosis: Acute bronchitis  Prescribed albuterol and prednisone    Amount and/or Complexity of Data  Reviewed  Radiology: ordered.    Risk  Prescription drug management.                                      Clinical Impression:   Final diagnoses:  [R05.9] Cough  [J20.9] Acute bronchitis, unspecified organism (Primary)        ED Disposition Condition    Discharge Stable          ED Prescriptions       Medication Sig Dispense Start Date End Date Auth. Provider    predniSONE (DELTASONE) 50 MG Tab Take 1 tablet (50 mg total) by mouth once daily. for 5 days 5 tablet 4/22/2024 4/27/2024 Kelsey Ross FNP    albuterol (PROVENTIL/VENTOLIN HFA) 90 mcg/actuation inhaler Inhale 2 puffs into the lungs every 4 (four) hours as needed for Wheezing. Rescue 8 g 4/22/2024 -- Kelsey Ross FNP          Follow-up Information    None          Kelsey Ross FNP  04/22/24 1794

## 2024-04-22 NOTE — Clinical Note
"Ney Wing" Yvonne was seen and treated in our emergency department on 4/22/2024.  He may return to work on 04/23/2024.       If you have any questions or concerns, please don't hesitate to call.      Daphnie YOU    "

## 2024-04-30 ENCOUNTER — HOSPITAL ENCOUNTER (EMERGENCY)
Facility: HOSPITAL | Age: 34
Discharge: HOME | End: 2024-04-30

## 2024-04-30 VITALS
TEMPERATURE: 98 F | RESPIRATION RATE: 18 BRPM | BODY MASS INDEX: 20.56 KG/M2 | HEART RATE: 63 BPM | DIASTOLIC BLOOD PRESSURE: 85 MMHG | HEIGHT: 67 IN | WEIGHT: 131 LBS | SYSTOLIC BLOOD PRESSURE: 158 MMHG | OXYGEN SATURATION: 100 %

## 2024-04-30 DIAGNOSIS — J06.9 URI WITH COUGH AND CONGESTION: Primary | ICD-10-CM

## 2024-04-30 DIAGNOSIS — R05.9 COUGH: ICD-10-CM

## 2024-04-30 LAB
ANION GAP SERPL CALCULATED.3IONS-SCNC: 10 MMOL/L (ref 7–16)
BASOPHILS # BLD AUTO: 0.03 K/UL (ref 0–0.2)
BASOPHILS NFR BLD AUTO: 0.8 % (ref 0–1)
BUN SERPL-MCNC: 15 MG/DL (ref 7–18)
BUN/CREAT SERPL: 13 (ref 6–20)
CALCIUM SERPL-MCNC: 9.1 MG/DL (ref 8.5–10.1)
CHLORIDE SERPL-SCNC: 105 MMOL/L (ref 98–107)
CO2 SERPL-SCNC: 32 MMOL/L (ref 21–32)
CREAT SERPL-MCNC: 1.17 MG/DL (ref 0.7–1.3)
DIFFERENTIAL METHOD BLD: ABNORMAL
EGFR (NO RACE VARIABLE) (RUSH/TITUS): 85 ML/MIN/1.73M2
EOSINOPHIL # BLD AUTO: 0.03 K/UL (ref 0–0.5)
EOSINOPHIL NFR BLD AUTO: 0.8 % (ref 1–4)
ERYTHROCYTE [DISTWIDTH] IN BLOOD BY AUTOMATED COUNT: 12.5 % (ref 11.5–14.5)
GLUCOSE SERPL-MCNC: 87 MG/DL (ref 74–106)
HCT VFR BLD AUTO: 52.8 % (ref 40–54)
HGB BLD-MCNC: 16.7 G/DL (ref 13.5–18)
IMM GRANULOCYTES # BLD AUTO: 0.04 K/UL (ref 0–0.04)
IMM GRANULOCYTES NFR BLD: 1 % (ref 0–0.4)
LYMPHOCYTES # BLD AUTO: 1.91 K/UL (ref 1–4.8)
LYMPHOCYTES NFR BLD AUTO: 50 % (ref 27–41)
MCH RBC QN AUTO: 26.4 PG (ref 27–31)
MCHC RBC AUTO-ENTMCNC: 31.6 G/DL (ref 32–36)
MCV RBC AUTO: 83.5 FL (ref 80–96)
MONOCYTES # BLD AUTO: 0.58 K/UL (ref 0–0.8)
MONOCYTES NFR BLD AUTO: 15.2 % (ref 2–6)
MPC BLD CALC-MCNC: 10.7 FL (ref 9.4–12.4)
NEUTROPHILS # BLD AUTO: 1.23 K/UL (ref 1.8–7.7)
NEUTROPHILS NFR BLD AUTO: 32.2 % (ref 53–65)
NRBC # BLD AUTO: 0 X10E3/UL
NRBC, AUTO (.00): 0 %
PLATELET # BLD AUTO: 242 K/UL (ref 150–400)
POTASSIUM SERPL-SCNC: 4.6 MMOL/L (ref 3.5–5.1)
RBC # BLD AUTO: 6.32 M/UL (ref 4.6–6.2)
SODIUM SERPL-SCNC: 142 MMOL/L (ref 136–145)
WBC # BLD AUTO: 3.82 K/UL (ref 4.5–11)

## 2024-04-30 PROCEDURE — 85025 COMPLETE CBC W/AUTO DIFF WBC: CPT | Performed by: NURSE PRACTITIONER

## 2024-04-30 PROCEDURE — 80048 BASIC METABOLIC PNL TOTAL CA: CPT | Performed by: NURSE PRACTITIONER

## 2024-04-30 PROCEDURE — 99284 EMERGENCY DEPT VISIT MOD MDM: CPT | Mod: 25

## 2024-04-30 PROCEDURE — 36415 COLL VENOUS BLD VENIPUNCTURE: CPT | Performed by: NURSE PRACTITIONER

## 2024-04-30 PROCEDURE — 99284 EMERGENCY DEPT VISIT MOD MDM: CPT | Mod: ,,, | Performed by: NURSE PRACTITIONER

## 2024-04-30 RX ORDER — BENZONATATE 100 MG/1
100 CAPSULE ORAL 3 TIMES DAILY PRN
Qty: 20 CAPSULE | Refills: 0 | Status: SHIPPED | OUTPATIENT
Start: 2024-04-30 | End: 2024-05-10

## 2024-04-30 RX ORDER — AZITHROMYCIN 250 MG/1
TABLET, FILM COATED ORAL
Qty: 6 TABLET | Refills: 0 | Status: SHIPPED | OUTPATIENT
Start: 2024-04-30 | End: 2024-05-05

## 2024-04-30 RX ORDER — CHLORPHENIRAMINE MALEATE AND PHENYLEPHRINE HYDROCHLORIDE 4; 10 MG/1; MG/1
1 TABLET, COATED ORAL EVERY 6 HOURS PRN
Qty: 20 TABLET | Refills: 0 | Status: SHIPPED | OUTPATIENT
Start: 2024-04-30 | End: 2024-05-10

## 2024-04-30 NOTE — Clinical Note
"Ney Wing" Yvonne was seen and treated in our emergency department on 4/30/2024.  He may return to work on 05/01/2024.       If you have any questions or concerns, please don't hesitate to call.      Amarilis Fontaine FNP"

## 2024-04-30 NOTE — DISCHARGE INSTRUCTIONS
Use prescriptions as directed. Alternate Tylenol and Ibuprofen as needed for pain/fever. Ensure you are drinking plenty of fluids, especially water. Follow up with your primary care provider next week for recheck and continued care and management. Return to the ED for worsening signs and symptoms or otherwise as needed.

## 2024-04-30 NOTE — ED TRIAGE NOTES
Chief Complaint   Patient presents with    Cough     Pt presents to ed with c/o having cough and still not breathing well since last visit on 4/22. Pt does not have primary provider

## 2024-04-30 NOTE — ED PROVIDER NOTES
"Encounter Date: 4/30/2024       History     Chief Complaint   Patient presents with    Cough     Pt presents to ed with c/o having cough and still not breathing well since last visit on 4/22. Pt does not have primary provider      34 y/o AAM presents to the emergency department with c/o cough and not "breathing right" since his last ED visit on 04/22. He was seen and diagnosed with Bronchitis.He was treated with steroids and an albuterol inhaler. He reports compliance with treatment. He states he initially may have felt some better but now symptoms have worsened again. He has not checked his temp. He reports generally not feeling well. He states his cough is dry and non productive. He denies runny nose. He denies chest pain. No recent travel. He denies nicotine, alcohol or illicit drug use. He has taken nothing additional than what is listed above for his symptoms.    The history is provided by the patient.     Review of patient's allergies indicates:  No Known Allergies  No past medical history on file.  No past surgical history on file.  No family history on file.  Social History     Tobacco Use    Smoking status: Never    Smokeless tobacco: Never     Review of Systems   All other systems reviewed and are negative.      Physical Exam     Initial Vitals [04/30/24 1159]   BP Pulse Resp Temp SpO2   (!) 158/85 63 18 97.8 °F (36.6 °C) 100 %      MAP       --         Physical Exam    Constitutional: He appears well-developed and well-nourished. He is cooperative.  Non-toxic appearance.   HENT:   Right Ear: Tympanic membrane normal.   Left Ear: Tympanic membrane normal.   Nose: Mucosal edema present.   Mouth/Throat: Oropharynx is clear and moist and mucous membranes are normal.   PND   Cardiovascular:  Normal rate, regular rhythm and normal heart sounds.           Pulmonary/Chest: Effort normal and breath sounds normal.   Abdominal: Abdomen is soft. Bowel sounds are normal. There is no abdominal tenderness. "     Neurological: He is alert and oriented to person, place, and time.   Skin: Skin is warm, dry and intact. Capillary refill takes less than 2 seconds.         Medical Screening Exam   See Full Note    ED Course   Procedures  Labs Reviewed   CBC WITH DIFFERENTIAL - Abnormal; Notable for the following components:       Result Value    WBC 3.82 (*)     RBC 6.32 (*)     MCH 26.4 (*)     MCHC 31.6 (*)     Neutrophils % 32.2 (*)     Lymphocytes % 50.0 (*)     Monocytes % 15.2 (*)     Eosinophils % 0.8 (*)     Immature Granulocytes % 1.0 (*)     Neutrophils, Abs 1.23 (*)     All other components within normal limits   BASIC METABOLIC PANEL - Normal   CBC W/ AUTO DIFFERENTIAL    Narrative:     The following orders were created for panel order CBC auto differential.  Procedure                               Abnormality         Status                     ---------                               -----------         ------                     CBC with Differential[[id number]]       Abnormal            Final result                 Please view results for these tests on the individual orders.          Imaging Results              X-Ray Chest PA And Lateral (Final result)  Result time 04/30/24 12:56:14      Final result by Roshan Dougherty II, MD (04/30/24 12:56:14)                   Impression:      No evidence of cardiopulmonary disease.      Electronically signed by: Roshan Dougherty  Date:    04/30/2024  Time:    12:56               Narrative:    EXAMINATION:  XR CHEST PA AND LATERAL    CLINICAL HISTORY:  Cough, unspecified    COMPARISON:  22 April 2024    TECHNIQUE:  XR CHEST PA AND LATERAL    FINDINGS:  The heart and mediastinum are normal in size and configuration.  The pulmonary vascularity is normal in caliber.  No lung infiltrates, effusions, pneumothorax or other abnormality is demonstrated.                                       Medications - No data to display  Medical Decision Making  32 y/o AAM presents to the  "emergency department with c/o cough and not "breathing right" since his last ED visit on 04/22. He was seen and diagnosed with Bronchitis.He was treated with steroids and an albuterol inhaler. He reports compliance with treatment. He states he initially may have felt some better but now symptoms have worsened again. He has not checked his temp. He reports generally not feeling well. He states his cough is dry and non productive. He denies runny nose. He denies chest pain. No recent travel. He denies nicotine, alcohol or illicit drug use. He has taken nothing additional than what is listed above for his symptoms.    Problems Addressed:  URI with cough and congestion:     Details: Pt is afebrile, CXR negative. Rx for Zpack, Ed ahist and Tessalon. Pt states still has Albuterol inhaler. Counseled on use and supportive measures. Follow up instructions given. Warning s/s discussed and return precautions given; the patient has v/u.    Amount and/or Complexity of Data Reviewed  Labs: ordered.  Radiology: ordered.    Risk  OTC drugs.  Prescription drug management.                                      Clinical Impression:   Final diagnoses:  [R05.9] Cough  [J06.9] URI with cough and congestion (Primary)        ED Disposition Condition    Discharge Stable          ED Prescriptions       Medication Sig Dispense Start Date End Date Auth. Provider    azithromycin (Z-DELILAH) 250 MG tablet Take 2 tablets by mouth on day 1; Take 1 tablet by mouth on days 2-5 6 tablet 4/30/2024 5/5/2024 Amarilis Fontaine FNP    chlorpheniramine-phenylephrine (ED A-HIST) 4-10 mg per tablet Take 1 tablet by mouth every 6 (six) hours as needed for Congestion. 20 tablet 4/30/2024 5/10/2024 Amarilis Fontaine FNP    benzonatate (TESSALON) 100 MG capsule Take 1 capsule (100 mg total) by mouth 3 (three) times daily as needed. 20 capsule 4/30/2024 5/10/2024 Amarilis Fontaine FNP          Follow-up Information       Follow up With Specialties Details Why Contact Info "    Primary Care Provider  In 1 week               Amarilis Fontaine FNP  04/30/24 1624

## 2024-05-13 ENCOUNTER — HOSPITAL ENCOUNTER (EMERGENCY)
Facility: HOSPITAL | Age: 34
Discharge: HOME | End: 2024-05-13

## 2024-05-13 VITALS
HEART RATE: 60 BPM | DIASTOLIC BLOOD PRESSURE: 79 MMHG | SYSTOLIC BLOOD PRESSURE: 126 MMHG | WEIGHT: 147 LBS | RESPIRATION RATE: 17 BRPM | OXYGEN SATURATION: 97 % | TEMPERATURE: 98 F | BODY MASS INDEX: 23.63 KG/M2 | HEIGHT: 66 IN

## 2024-05-13 DIAGNOSIS — M54.50 ACUTE LOW BACK PAIN, UNSPECIFIED BACK PAIN LATERALITY, UNSPECIFIED WHETHER SCIATICA PRESENT: Primary | ICD-10-CM

## 2024-05-13 PROCEDURE — 99283 EMERGENCY DEPT VISIT LOW MDM: CPT | Mod: 25

## 2024-05-13 PROCEDURE — 99284 EMERGENCY DEPT VISIT MOD MDM: CPT | Mod: ,,, | Performed by: NURSE PRACTITIONER

## 2024-05-13 RX ORDER — IBUPROFEN 800 MG/1
800 TABLET ORAL 3 TIMES DAILY
Qty: 15 TABLET | Refills: 0 | Status: SHIPPED | OUTPATIENT
Start: 2024-05-13 | End: 2024-05-18

## 2024-05-13 RX ORDER — METHOCARBAMOL 500 MG/1
1000 TABLET, FILM COATED ORAL 3 TIMES DAILY
Qty: 30 TABLET | Refills: 0 | Status: SHIPPED | OUTPATIENT
Start: 2024-05-13 | End: 2024-05-18

## 2024-05-13 NOTE — Clinical Note
"Ney Wing" Yvonne was seen and treated in our emergency department on 5/13/2024.  He may return to work on 05/14/2024.       If you have any questions or concerns, please don't hesitate to call.      Kelsey Ross, KADI"

## 2024-05-13 NOTE — ED PROVIDER NOTES
Encounter Date: 5/13/2024       History     Chief Complaint   Patient presents with    Back Pain     33-year-old male presents to the emergency department to be evaluated for low back pain.  He began having low back pain a couple days ago.  Denies any recent fall, injury, fever, chills, dysuria.  He said that he stands up a lot at work and thinks that his pain is probably related to that.  He reports that [place] and has to [place] up all day.    The history is provided by the patient.   Back Pain   This is a new problem. The current episode started two days ago. The pain is associated with no known injury. Pertinent negatives include no chest pain, no fever, no numbness, no weight loss, no headaches, no abdominal pain, no abdominal swelling, no bowel incontinence, no perianal numbness, no bladder incontinence, no dysuria, no pelvic pain, no leg pain, no paresthesias, no paresis, no tingling and no weakness.     Review of patient's allergies indicates:  No Known Allergies  No past medical history on file.  No past surgical history on file.  No family history on file.  Social History     Tobacco Use    Smoking status: Never    Smokeless tobacco: Never     Review of Systems   Constitutional:  Negative for fever and weight loss.   Cardiovascular:  Negative for chest pain.   Gastrointestinal:  Negative for abdominal pain and bowel incontinence.   Genitourinary:  Negative for bladder incontinence, dysuria and pelvic pain.   Musculoskeletal:  Positive for back pain.   Neurological:  Negative for tingling, weakness, numbness, headaches and paresthesias.   All other systems reviewed and are negative.      Physical Exam     Initial Vitals [05/13/24 1001]   BP Pulse Resp Temp SpO2   126/79 60 17 98 °F (36.7 °C) 97 %      MAP       --         Physical Exam    Vitals reviewed.  Constitutional: He appears well-developed and well-nourished.   Neck: Neck supple.   Cardiovascular:  Normal rate and  regular rhythm.           Pulmonary/Chest: Breath sounds normal.   Abdominal: Abdomen is soft. Bowel sounds are normal. He exhibits no distension and no mass. There is no abdominal tenderness. There is no rebound and no guarding.   Musculoskeletal:         General: Normal range of motion.      Cervical back: Normal and neck supple.      Thoracic back: Normal.      Lumbar back: Tenderness and bony tenderness present. No swelling, edema, deformity, signs of trauma, lacerations or spasms. Normal range of motion. No scoliosis.     Neurological: He is alert and oriented to person, place, and time. He has normal strength. GCS score is 15. GCS eye subscore is 4. GCS verbal subscore is 5. GCS motor subscore is 6.   Skin: Skin is warm and dry. Capillary refill takes less than 2 seconds.   Psychiatric: He has a normal mood and affect.         Medical Screening Exam   See Full Note    ED Course   Procedures  Labs Reviewed - No data to display       Imaging Results              X-Ray Lumbar Spine Ap And Lateral (Final result)  Result time 05/13/24 10:47:48      Final result by Roshan Dougherty II, MD (05/13/24 10:47:48)                   Impression:      No evidence of abnormality demonstrated      Electronically signed by: Roshan Dougherty  Date:    05/13/2024  Time:    10:47               Narrative:    EXAMINATION:  XR LUMBAR SPINE AP AND LATERAL    CLINICAL HISTORY:  back pain;    COMPARISON:  None.    TECHNIQUE:  XR LUMBAR SPINE AP AND LATERAL    FINDINGS:  No fracture is seen. Vertebral body heights and alignment are normal.  The disc space heights are well-maintained.  No significant degenerative change is present.                                       Medications - No data to display  Medical Decision Making  33-year-old male presents to the emergency department to be evaluated for low back pain.  He began having low back pain a couple days ago.  Denies any recent fall, injury, fever, chills, dysuria.  He said that he  stands up a lot at work and thinks that his pain is probably related to that.  He reports that [place] and has to [place] up all day.  X-rays ordered, films reviewed as well as the radiologist's interpretation significant for no acute process  Diagnosis:  Low back pain   Robaxin and Motrin prescribed    Amount and/or Complexity of Data Reviewed  Radiology: ordered.    Risk  Prescription drug management.                                      Clinical Impression:   Final diagnoses:  [M54.50] Acute low back pain, unspecified back pain laterality, unspecified whether sciatica present (Primary)        ED Disposition Condition    Discharge Stable          ED Prescriptions       Medication Sig Dispense Start Date End Date Auth. Provider    methocarbamoL (ROBAXIN) 500 MG Tab Take 2 tablets (1,000 mg total) by mouth 3 (three) times daily. for 5 days 30 tablet 5/13/2024 5/18/2024 Kelsey Ross FNP    ibuprofen (ADVIL,MOTRIN) 800 MG tablet Take 1 tablet (800 mg total) by mouth 3 (three) times daily. for 5 days 15 tablet 5/13/2024 5/18/2024 Kelsey Ross FNP          Follow-up Information    None          Kelsey Ross FNP  05/13/24 3507

## 2024-07-18 ENCOUNTER — HOSPITAL ENCOUNTER (EMERGENCY)
Facility: HOSPITAL | Age: 34
Discharge: HOME | End: 2024-07-18
Payer: COMMERCIAL

## 2024-07-18 VITALS
TEMPERATURE: 99 F | SYSTOLIC BLOOD PRESSURE: 129 MMHG | HEART RATE: 62 BPM | RESPIRATION RATE: 18 BRPM | WEIGHT: 142 LBS | OXYGEN SATURATION: 100 % | BODY MASS INDEX: 22.82 KG/M2 | DIASTOLIC BLOOD PRESSURE: 67 MMHG | HEIGHT: 66 IN

## 2024-07-18 DIAGNOSIS — M54.50 LOW BACK PAIN WITHOUT SCIATICA, UNSPECIFIED BACK PAIN LATERALITY, UNSPECIFIED CHRONICITY: Primary | ICD-10-CM

## 2024-07-18 PROCEDURE — 99283 EMERGENCY DEPT VISIT LOW MDM: CPT

## 2024-07-18 RX ORDER — METHOCARBAMOL 500 MG/1
1000 TABLET, FILM COATED ORAL 3 TIMES DAILY
Qty: 30 TABLET | Refills: 0 | Status: SHIPPED | OUTPATIENT
Start: 2024-07-18 | End: 2024-07-23

## 2024-07-18 RX ORDER — IBUPROFEN 800 MG/1
800 TABLET ORAL 3 TIMES DAILY
Qty: 15 TABLET | Refills: 0 | Status: SHIPPED | OUTPATIENT
Start: 2024-07-18 | End: 2024-07-23

## 2024-07-18 NOTE — ED PROVIDER NOTES
Encounter Date: 7/18/2024       History     Chief Complaint   Patient presents with    Back Pain     Reports back pain and muscle aches x 2 days.      33-year-old male presents to the emergency department to be evaluated for low back pain.  Denies any recent fall or injury, numbness or weakness in his lower extremities, loss of control of bowels or bladder, dysuria, fever, chills.    The history is provided by the patient.   Back Pain   This is a new problem. The current episode started yesterday. The pain is associated with no known injury. Pertinent negatives include no chest pain, no fever, no numbness, no weight loss, no headaches, no abdominal pain, no abdominal swelling, no bowel incontinence, no perianal numbness, no bladder incontinence, no dysuria, no pelvic pain, no leg pain, no paresthesias, no paresis, no tingling and no weakness.     Review of patient's allergies indicates:  No Known Allergies  History reviewed. No pertinent past medical history.  History reviewed. No pertinent surgical history.  No family history on file.  Social History     Tobacco Use    Smoking status: Never    Smokeless tobacco: Never     Review of Systems   Constitutional:  Negative for fever and weight loss.   Cardiovascular:  Negative for chest pain.   Gastrointestinal:  Negative for abdominal pain and bowel incontinence.   Genitourinary:  Negative for bladder incontinence, dysuria and pelvic pain.   Musculoskeletal:  Positive for back pain.   Neurological:  Negative for tingling, weakness, numbness, headaches and paresthesias.   All other systems reviewed and are negative.      Physical Exam     Initial Vitals [07/18/24 1435]   BP Pulse Resp Temp SpO2   129/67 62 18 98.5 °F (36.9 °C) 100 %      MAP       --         Physical Exam    Vitals reviewed.  Constitutional: He appears well-developed and well-nourished.   HENT:   Head: Normocephalic and atraumatic.   Eyes: EOM are normal. Pupils are equal, round, and reactive to light.    Neck: Neck supple.   Cardiovascular:  Normal rate and regular rhythm.           Pulmonary/Chest: Breath sounds normal.   Abdominal: Abdomen is soft. Bowel sounds are normal. He exhibits no distension and no mass. There is no abdominal tenderness. There is no rebound and no guarding.   Musculoskeletal:         General: Normal range of motion.      Cervical back: Normal and neck supple.      Thoracic back: Normal.      Lumbar back: Normal.     Neurological: He is alert and oriented to person, place, and time. He has normal strength. GCS score is 15. GCS eye subscore is 4. GCS verbal subscore is 5. GCS motor subscore is 6.   Skin: Skin is warm and dry. Capillary refill takes less than 2 seconds.   Psychiatric: He has a normal mood and affect.         Medical Screening Exam   See Full Note    ED Course   Procedures  Labs Reviewed - No data to display       Imaging Results    None          Medications - No data to display  Medical Decision Making  33-year-old male presents to the emergency department to be evaluated for low back pain.  Denies any recent fall or injury.  Diagnosis: Low back pain  Prescribed Robaxin and Motrin    Risk  Prescription drug management.                                      Clinical Impression:   Final diagnoses:  [M54.50] Low back pain without sciatica, unspecified back pain laterality, unspecified chronicity (Primary)        ED Disposition Condition    Discharge Stable          ED Prescriptions       Medication Sig Dispense Start Date End Date Auth. Provider    ibuprofen (ADVIL,MOTRIN) 800 MG tablet Take 1 tablet (800 mg total) by mouth 3 (three) times daily. for 5 days 15 tablet 7/18/2024 7/23/2024 Kelsey Ross FNP    methocarbamoL (ROBAXIN) 500 MG Tab Take 2 tablets (1,000 mg total) by mouth 3 (three) times daily. for 5 days 30 tablet 7/18/2024 7/23/2024 Kelsey Ross FNP          Follow-up Information    None          Kelsey Ross FNP  07/18/24 2130

## 2024-07-18 NOTE — Clinical Note
"Ney Wing" Yvonne was seen and treated in our emergency department on 7/18/2024.  He may return to work on 07/19/2024.       If you have any questions or concerns, please don't hesitate to call.      Kelsey Ross, GENIP"

## 2024-07-23 ENCOUNTER — HOSPITAL ENCOUNTER (EMERGENCY)
Facility: HOSPITAL | Age: 34
Discharge: HOME | End: 2024-07-23
Payer: COMMERCIAL

## 2024-07-23 VITALS
SYSTOLIC BLOOD PRESSURE: 136 MMHG | HEIGHT: 66 IN | BODY MASS INDEX: 23.3 KG/M2 | TEMPERATURE: 98 F | WEIGHT: 145 LBS | RESPIRATION RATE: 16 BRPM | HEART RATE: 71 BPM | DIASTOLIC BLOOD PRESSURE: 79 MMHG | OXYGEN SATURATION: 97 %

## 2024-07-23 DIAGNOSIS — S39.012A LUMBAR STRAIN, INITIAL ENCOUNTER: Primary | ICD-10-CM

## 2024-07-23 PROCEDURE — 99284 EMERGENCY DEPT VISIT MOD MDM: CPT | Mod: 25

## 2024-07-23 PROCEDURE — 96372 THER/PROPH/DIAG INJ SC/IM: CPT | Performed by: NURSE PRACTITIONER

## 2024-07-23 PROCEDURE — 63600175 PHARM REV CODE 636 W HCPCS: Performed by: NURSE PRACTITIONER

## 2024-07-23 RX ORDER — TIZANIDINE 4 MG/1
4 TABLET ORAL EVERY 6 HOURS PRN
Qty: 20 TABLET | Refills: 0 | Status: SHIPPED | OUTPATIENT
Start: 2024-07-23 | End: 2024-08-02

## 2024-07-23 RX ORDER — KETOROLAC TROMETHAMINE 30 MG/ML
30 INJECTION, SOLUTION INTRAMUSCULAR; INTRAVENOUS
Status: COMPLETED | OUTPATIENT
Start: 2024-07-23 | End: 2024-07-23

## 2024-07-23 RX ORDER — IBUPROFEN 800 MG/1
800 TABLET ORAL 3 TIMES DAILY
Qty: 20 TABLET | Refills: 0 | Status: SHIPPED | OUTPATIENT
Start: 2024-07-23

## 2024-07-23 RX ADMIN — KETOROLAC TROMETHAMINE 30 MG: 30 INJECTION, SOLUTION INTRAMUSCULAR at 02:07

## 2024-07-23 NOTE — DISCHARGE INSTRUCTIONS
Take medication as prescribed.  Follow up with primary care provider in 2 3 days if symptoms do not improve with treatment.  Alternate heat and ice compresses for comfort.  Return to the ER with new or worsening symptoms.

## 2024-07-23 NOTE — ED PROVIDER NOTES
Encounter Date: 7/23/2024       History     Chief Complaint   Patient presents with    Low-back Pain     Pt presents to ED via POV with c/o lower back pain. Pt reports this has been ongoing as he has 2 strenuous jobs and has been seen here a few times for the pain and wants something for aching.      Patient presents to the ER with complaint of lower back pain.  Patient reports his symptoms have been ongoing x1 month.  Patient reports he was seen in the ER couple of weeks ago for similar complaint.  States he was given muscle relaxers and that improved his symptoms.  He states he works in a job where it was a lot of heavy lifting and pulling.  He works at the [employer].  He denies loss of control of bowel or bladder.  He denies known injury or accident.  Patient reports the pain is intermittent.  He states the pain is worse with movement and heavy lifting.  He describes pain in the left lower back that radiates into left hip and leg.    The history is provided by the patient. No  was used.     Review of patient's allergies indicates:  No Known Allergies  History reviewed. No pertinent past medical history.  History reviewed. No pertinent surgical history.  No family history on file.  Social History     Tobacco Use    Smoking status: Never    Smokeless tobacco: Never   Substance Use Topics    Alcohol use: Not Currently    Drug use: Not Currently     Review of Systems   Constitutional:  Positive for activity change.   Musculoskeletal:  Positive for back pain and myalgias.   All other systems reviewed and are negative.      Physical Exam     Initial Vitals [07/23/24 1423]   BP Pulse Resp Temp SpO2   136/79 71 16 98.2 °F (36.8 °C) 97 %      MAP       --         Physical Exam    Nursing note and vitals reviewed.  Constitutional: He appears well-developed and well-nourished.   HENT:   Head: Normocephalic.   Nose: Nose normal.   Mouth/Throat: Oropharynx is clear and moist.   Eyes: EOM are normal.    Neck:   Normal range of motion.  Cardiovascular:  Normal rate, normal heart sounds and intact distal pulses.           Pulmonary/Chest: Breath sounds normal.   Abdominal: Abdomen is soft. Bowel sounds are normal.   Musculoskeletal:         General: Normal range of motion.      Cervical back: Normal range of motion.     Neurological: He is alert and oriented to person, place, and time. He has normal strength. GCS score is 15. GCS eye subscore is 4. GCS verbal subscore is 5. GCS motor subscore is 6.   Skin: Skin is warm and dry. Capillary refill takes less than 2 seconds.   Psychiatric: He has a normal mood and affect. Thought content normal.         Medical Screening Exam   See Full Note    ED Course   Procedures  Labs Reviewed - No data to display       Imaging Results    None          Medications   ketorolac injection 30 mg (has no administration in time range)     Medical Decision Making  Patient presents to the ER with complaint of lower back pain.  Patient reports his symptoms have been ongoing x1 month.  Patient reports he was seen in the ER couple of weeks ago for similar complaint.  States he was given muscle relaxers and that improved his symptoms.  He states he works in a job where it was a lot of heavy lifting and pulling.  He works at the chicken plant.  He denies loss of control of bowel or bladder.  He denies known injury or accident.  Patient reports the pain is intermittent.  He states the pain is worse with movement and heavy lifting.  He describes pain in the left lower back that radiates into left hip and leg.      Amount and/or Complexity of Data Reviewed  External Data Reviewed: radiology and notes.     Details: Lumbar x-rays from May ER visit reviewed.  ER visit from 07/08/2024 reviewed.  Discussion of management or test interpretation with external provider(s): Toradol 30 mg IM to treat back pain    Patient was discharged home with diagnosis of lumbar strain.  He was given a prescription for  tizanidine and ibuprofen to take as prescribed he was instructed to alternate heat and ice compresses for comfort he was told to return to the ER with new or worsening symptoms.    Risk  Prescription drug management.                                      Clinical Impression:   Final diagnoses:  [S39.012A] Lumbar strain, initial encounter (Primary)               Indira Oliva, FNP  07/23/24 [id number]

## 2024-07-23 NOTE — Clinical Note
"Ney Wing" Yvonne was seen and treated in our emergency department on 7/23/2024.  He may return to work on 07/24/2024.       If you have any questions or concerns, please don't hesitate to call.      Eboni YOU    "

## 2024-07-31 ENCOUNTER — HOSPITAL ENCOUNTER (EMERGENCY)
Facility: HOSPITAL | Age: 34
Discharge: HOME | End: 2024-07-31
Payer: COMMERCIAL

## 2024-07-31 VITALS
OXYGEN SATURATION: 97 % | SYSTOLIC BLOOD PRESSURE: 133 MMHG | RESPIRATION RATE: 16 BRPM | HEIGHT: 67 IN | WEIGHT: 145.06 LBS | HEART RATE: 62 BPM | BODY MASS INDEX: 22.77 KG/M2 | TEMPERATURE: 99 F | DIASTOLIC BLOOD PRESSURE: 73 MMHG

## 2024-07-31 DIAGNOSIS — R51.9 NONINTRACTABLE HEADACHE, UNSPECIFIED CHRONICITY PATTERN, UNSPECIFIED HEADACHE TYPE: Primary | ICD-10-CM

## 2024-07-31 PROCEDURE — 25000003 PHARM REV CODE 250: Performed by: NURSE PRACTITIONER

## 2024-07-31 PROCEDURE — 99283 EMERGENCY DEPT VISIT LOW MDM: CPT

## 2024-07-31 RX ORDER — ACETAMINOPHEN 325 MG/1
650 TABLET ORAL
Status: COMPLETED | OUTPATIENT
Start: 2024-07-31 | End: 2024-07-31

## 2024-07-31 RX ADMIN — ACETAMINOPHEN 650 MG: 325 TABLET ORAL at 02:07

## 2024-08-08 ENCOUNTER — HOSPITAL ENCOUNTER (EMERGENCY)
Facility: HOSPITAL | Age: 34
Discharge: HOME | End: 2024-08-08
Payer: COMMERCIAL

## 2024-08-08 VITALS
OXYGEN SATURATION: 97 % | BODY MASS INDEX: 21.03 KG/M2 | SYSTOLIC BLOOD PRESSURE: 124 MMHG | RESPIRATION RATE: 20 BRPM | WEIGHT: 134 LBS | HEART RATE: 59 BPM | HEIGHT: 67 IN | TEMPERATURE: 98 F | DIASTOLIC BLOOD PRESSURE: 68 MMHG

## 2024-08-08 DIAGNOSIS — M79.601 PAIN IN BOTH UPPER EXTREMITIES: Primary | ICD-10-CM

## 2024-08-08 DIAGNOSIS — M79.602 PAIN IN BOTH UPPER EXTREMITIES: Primary | ICD-10-CM

## 2024-08-08 PROCEDURE — 99281 EMR DPT VST MAYX REQ PHY/QHP: CPT

## 2024-08-08 NOTE — Clinical Note
"Ney Wing" Yvonne was seen and treated in our emergency department on 8/8/2024.  He may return to work on 08/09/2024.       If you have any questions or concerns, please don't hesitate to call.      Kelsey Ross, KADI"

## 2024-08-08 NOTE — ED PROVIDER NOTES
"Encounter Date: 8/8/2024       History     Chief Complaint   Patient presents with    Letter for School/Work     Pt states "I just need a work excuse"     33-year-old male presents to the emergency department to be evaluated because his arms feels sore.  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  He said that his arms just feels sore, he was not able to go to work today and needs a work excuse.    The history is provided by the patient.     Review of patient's allergies indicates:  No Known Allergies  History reviewed. No pertinent past medical history.  History reviewed. No pertinent surgical history.  No family history on file.  Social History     Tobacco Use    Smoking status: Never    Smokeless tobacco: Never   Substance Use Topics    Alcohol use: Not Currently    Drug use: Not Currently     Review of Systems   Constitutional:  Negative for chills and fever.   Respiratory:  Negative for chest tightness and shortness of breath.    Cardiovascular:  Negative for chest pain.   All other systems reviewed and are negative.      Physical Exam     Initial Vitals   BP Pulse Resp Temp SpO2   08/08/24 1215 08/08/24 1215 08/08/24 1216 08/08/24 1215 08/08/24 1215   124/68 (!) 59 20 98 °F (36.7 °C) 97 %      MAP       --                Physical Exam    Vitals reviewed.  Constitutional: He appears well-developed and well-nourished.   Neck: Neck supple.   Cardiovascular:  Normal rate and regular rhythm.           Pulmonary/Chest: Breath sounds normal.   Abdominal: Abdomen is soft. Bowel sounds are normal. He exhibits no distension and no mass. There is no abdominal tenderness. There is no rebound and no guarding.   Musculoskeletal:         General: Normal range of motion.      Right upper arm: Normal.      Left upper arm: Normal.      Right forearm: Normal.      Left forearm: Normal.      Right hand: Normal.      Left hand: Normal.      Cervical back: Neck supple.     Neurological: He is " alert and oriented to person, place, and time. He has normal strength. GCS score is 15. GCS eye subscore is 4. GCS verbal subscore is 5. GCS motor subscore is 6.   Skin: Skin is warm and dry. Capillary refill takes less than 2 seconds.   Psychiatric: He has a normal mood and affect.         Medical Screening Exam   See Full Note    ED Course   Procedures  Labs Reviewed - No data to display       Imaging Results    None          Medications - No data to display  Medical Decision Making  33-year-old male presents to the emergency department to be evaluated because his arms feels sore.  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  He said that his arms just feels sore, he was not able to go to work today and needs a work excuse.  Denies any injury  Diagnosis: Arm pain                                      Clinical Impression:   Final diagnoses:  [M79.601, M79.602] Pain in both upper extremities (Primary)        ED Disposition Condition    Discharge Stable          ED Prescriptions    None       Follow-up Information    None          Kelsey Ross, KADI  08/08/24 4614

## 2024-10-18 ENCOUNTER — OFFICE VISIT (OUTPATIENT)
Dept: FAMILY MEDICINE | Facility: CLINIC | Age: 34
End: 2024-10-18
Payer: COMMERCIAL

## 2024-10-18 VITALS — BODY MASS INDEX: 21.77 KG/M2 | WEIGHT: 139 LBS

## 2024-10-18 DIAGNOSIS — R21 RASH AND OTHER NONSPECIFIC SKIN ERUPTION: Primary | ICD-10-CM

## 2024-10-18 DIAGNOSIS — N48.9 PENILE LESION: ICD-10-CM

## 2024-10-18 DIAGNOSIS — B00.9 HSV-2 (HERPES SIMPLEX VIRUS 2) INFECTION: ICD-10-CM

## 2024-10-18 LAB
HIV 1+O+2 AB SERPL QL: NORMAL
SYPHILIS AB INTERPRETATION: REACTIVE

## 2024-10-18 PROCEDURE — 86780 TREPONEMA PALLIDUM: CPT | Mod: ,,, | Performed by: CLINICAL MEDICAL LABORATORY

## 2024-10-18 PROCEDURE — 87389 HIV-1 AG W/HIV-1&-2 AB AG IA: CPT | Mod: ,,, | Performed by: CLINICAL MEDICAL LABORATORY

## 2024-10-18 RX ORDER — VALACYCLOVIR HYDROCHLORIDE 1 G/1
1000 TABLET, FILM COATED ORAL 3 TIMES DAILY
Qty: 21 TABLET | Refills: 0 | Status: SHIPPED | OUTPATIENT
Start: 2024-10-18 | End: 2024-10-25

## 2024-10-18 RX ORDER — CEPHALEXIN 500 MG/1
500 CAPSULE ORAL EVERY 12 HOURS
Qty: 14 CAPSULE | Refills: 0 | Status: SHIPPED | OUTPATIENT
Start: 2024-10-18 | End: 2024-10-25

## 2024-10-18 NOTE — PROGRESS NOTES
"Subjective:       Patient ID: Ney Russell is a 33 y.o. male.    Chief Complaint: Rash ("Outbreak" all over body. Pt did not specify but states he was given a shot during last outbreak. )    Rash- widespread    Rash  Pertinent negatives include no congestion, cough, eye pain, fatigue, fever, shortness of breath, sore throat or vomiting.   Review of Systems   Constitutional:  Negative for appetite change, fatigue and fever.   HENT:  Negative for nasal congestion, ear pain and sore throat.    Eyes:  Negative for pain, discharge and itching.   Respiratory:  Negative for cough and shortness of breath.    Cardiovascular:  Negative for chest pain and leg swelling.   Gastrointestinal:  Negative for abdominal pain, change in bowel habit, nausea and vomiting.   Musculoskeletal:  Negative for back pain, gait problem and neck pain.   Integumentary:  Positive for rash. Negative for wound.   Neurological:  Negative for dizziness, weakness and headaches.   All other systems reviewed and are negative.      Objective:      Physical Exam  Vitals and nursing note reviewed. Exam conducted with a chaperone present.   Constitutional:       General: He is not in acute distress.     Appearance: Normal appearance. He is not ill-appearing, toxic-appearing or diaphoretic.   HENT:      Head: Normocephalic.      Left Ear: External ear normal.      Mouth/Throat:      Mouth: Mucous membranes are moist.   Eyes:      General: No scleral icterus.        Right eye: No discharge.         Left eye: No discharge.      Extraocular Movements: Extraocular movements intact.      Conjunctiva/sclera: Conjunctivae normal.      Pupils: Pupils are equal, round, and reactive to light.   Cardiovascular:      Rate and Rhythm: Normal rate and regular rhythm.      Pulses: Normal pulses.      Heart sounds: Normal heart sounds. No murmur heard.  Pulmonary:      Effort: Pulmonary effort is normal. No respiratory distress.      Breath sounds: Normal breath sounds. No " wheezing, rhonchi or rales.   Genitourinary:     Penis: Lesions present.        Musculoskeletal:         General: Normal range of motion.      Cervical back: Neck supple. No tenderness.   Lymphadenopathy:      Cervical: No cervical adenopathy.   Skin:     General: Skin is warm and dry.      Capillary Refill: Capillary refill takes less than 2 seconds.      Findings: Rash present.      Comments: Multiple maculopapular , crusted and pustular lesions noted widespread over arms, trunk, and legs   Neurological:      Mental Status: He is alert and oriented to person, place, and time.   Psychiatric:         Mood and Affect: Mood normal.         Behavior: Behavior normal.         Thought Content: Thought content normal.         Judgment: Judgment normal.          Assessment:       1. Rash and other nonspecific skin eruption    2. Penile lesion    3. HSV-2 (herpes simplex virus 2) infection        Plan:   Rash and other nonspecific skin eruption  -     HIV 1/2 Ag/Ab (4th Gen); Future; Expected date: 10/18/2024  -     Syphilis Antibody with reflex to RPR; Future; Expected date: 10/18/2024  -     cephALEXin (KEFLEX) 500 MG capsule; Take 1 capsule (500 mg total) by mouth every 12 (twelve) hours. for 7 days  Dispense: 14 capsule; Refill: 0    Penile lesion  -     HIV 1/2 Ag/Ab (4th Gen); Future; Expected date: 10/18/2024  -     Syphilis Antibody with reflex to RPR; Future; Expected date: 10/18/2024  -     valACYclovir (VALTREX) 1000 MG tablet; Take 1 tablet (1,000 mg total) by mouth 3 (three) times daily. for 7 days  Dispense: 21 tablet; Refill: 0    HSV-2 (herpes simplex virus 2) infection  -     valACYclovir (VALTREX) 1000 MG tablet; Take 1 tablet (1,000 mg total) by mouth 3 (three) times daily. for 7 days  Dispense: 21 tablet; Refill: 0           Risks, benefits, and side effects were discussed with the patient. All questions were answered to the fullest satisfaction of the patient, and pt verbalized understanding and  agreement to treatment plan. Pt was to call with any new or worsening symptoms, or present to the ER

## 2024-10-25 ENCOUNTER — PATIENT MESSAGE (OUTPATIENT)
Dept: RESEARCH | Facility: HOSPITAL | Age: 34
End: 2024-10-25

## 2024-10-29 ENCOUNTER — OFFICE VISIT (OUTPATIENT)
Dept: FAMILY MEDICINE | Facility: CLINIC | Age: 34
End: 2024-10-29
Payer: COMMERCIAL

## 2024-10-29 VITALS
HEART RATE: 70 BPM | HEIGHT: 67 IN | RESPIRATION RATE: 16 BRPM | SYSTOLIC BLOOD PRESSURE: 120 MMHG | OXYGEN SATURATION: 98 % | TEMPERATURE: 98 F | BODY MASS INDEX: 21.82 KG/M2 | WEIGHT: 139 LBS | DIASTOLIC BLOOD PRESSURE: 69 MMHG

## 2024-10-29 DIAGNOSIS — A51.49 SECONDARY SYPHILIS: Primary | ICD-10-CM

## 2024-10-29 DIAGNOSIS — L21.9 SEBORRHEIC DERMATITIS OF SCALP: ICD-10-CM

## 2024-10-29 PROCEDURE — 96372 THER/PROPH/DIAG INJ SC/IM: CPT | Mod: GC,,, | Performed by: FAMILY MEDICINE

## 2024-10-29 PROCEDURE — 3078F DIAST BP <80 MM HG: CPT | Mod: CPTII,,, | Performed by: FAMILY MEDICINE

## 2024-10-29 PROCEDURE — 99213 OFFICE O/P EST LOW 20 MIN: CPT | Mod: 25,GC,, | Performed by: FAMILY MEDICINE

## 2024-10-29 PROCEDURE — 3008F BODY MASS INDEX DOCD: CPT | Mod: CPTII,,, | Performed by: FAMILY MEDICINE

## 2024-10-29 PROCEDURE — 3074F SYST BP LT 130 MM HG: CPT | Mod: CPTII,,, | Performed by: FAMILY MEDICINE

## 2024-10-29 RX ORDER — KETOCONAZOLE 20 MG/ML
SHAMPOO, SUSPENSION TOPICAL
Qty: 120 ML | Refills: 3 | Status: SHIPPED | OUTPATIENT
Start: 2024-10-31

## 2024-10-29 NOTE — LETTER
October 29, 2024      [facility]  [address]  [place] [address]  Phone: [phone]  Fax: [phone]       Patient: Ney Russell   YOB: 1990  Date of Visit: 10/29/2024    To Whom It May Concern:    Usman Russell  was at [facility] on 10/29/2024. The patient may return to work on 10/30/2024 with no restrictions. If you have any questions or concerns, or if I can be of further assistance, please do not hesitate to contact me.    Sincerely,    Anton Hauser LPN

## 2024-10-29 NOTE — ASSESSMENT & PLAN NOTE
Pt has history of syphilis.   Pt has skin lesions that are widespread and do not itch. Received incorrect antibiotic treatment when he tested positive for syphilis in January 2024. Pt's most recent RPR titer 1:2 and pt's titer before that was nonreactive.   When this skin manifestation returned, pt was given Keflex instead of penicillin a few weeks ago.    Pt was also given antivirals (not sure if it was believed that the skin lesions were viral like HSV or if they were given for the penile lesion) This penile lesion was likely an abrasion rather than an infectious process.    Pt states that his current skins lesions are similar to his previous ones that cleared up from bicillin shots 2 yrs ago. Pt received 2 bicillin shots one week apart.      Small hyperkeratotic lesions throughout abdomen, back, groin, and arms.  Palms and soles are spared.      Likely a secondary syphilis.      Pt to be treated with bicillin, three shots over the next few weeks  First shot to be today: 2.4 million units  The subsequent shots should be 2.4 million units as well.  No known allergies to penicillin drugs.  Pt to follow up with Dr. Beverly next Tuesday(11/5). He will see me again the following Thursday(11/14).  Will follow up with titers if need be. Pt told to contact his sexual partners.

## 2024-10-29 NOTE — ASSESSMENT & PLAN NOTE
Pt complains of scalp lesions  Pt has been putting various oils in his head.    Flaky, oily skin throughout pt's scalp.     Likely seborrheic dermatitis.    2% ketoconazole shampoo twice a week.  Head and shoulders Nardin Oils recommended for his other shampooing.   Pt will be following up with me in two weeks. Will see how much has improved since then.

## 2024-10-29 NOTE — PROGRESS NOTES
Subjective:       Patient ID: Ney Russell is a 33 y.o. male.    Chief Complaint: Rash (LABS DONE 10.18.24, POSITIVE FOR STD, UNABLE TO SWALLOW VALTREX, NEEDS ALT THERAPY OR PILL SIZE)    34 yo male comes in after a previous positive result on his RPR titer. Pt has had previous syphilis infection, but a most recent titer reads 1:2, indicating active infection.   Before his testing recently, pt was complaining of skin rash and a penile lesion. The other tests came back negative.  Pt was recently treated with keflex and valcyclovir for these lesions. But the skin rash never resolved.    Pt likely had resolution of his syphilis 2 years ago but has since been reinfected.    Pt complains of scalp flaking. Has been using various oils to treat it with no resolution.           Current Outpatient Medications:     albuterol (PROVENTIL/VENTOLIN HFA) 90 mcg/actuation inhaler, Inhale 2 puffs into the lungs every 4 (four) hours as needed for Wheezing. Rescue (Patient not taking: Reported on 10/29/2024), Disp: 8 g, Rfl: 0    hydrOXYzine pamoate (VISTARIL) 25 MG Cap, Take 1 capsule (25 mg total) by mouth every 4 (four) hours as needed (itching). (Patient not taking: Reported on 10/29/2024), Disp: 30 capsule, Rfl: 0    ibuprofen (ADVIL,MOTRIN) 800 MG tablet, Take 1 tablet (800 mg total) by mouth 3 (three) times daily. (Patient not taking: Reported on 10/29/2024), Disp: 20 tablet, Rfl: 0    ketoconazole (NIZORAL) 2 % shampoo, Apply topically twice a week., Disp: 120 mL, Rfl: 3    valACYclovir (VALTREX) 1000 MG tablet, Take 1 tablet (1,000 mg total) by mouth 3 (three) times daily. for 7 days, Disp: 21 tablet, Rfl: 0    Review of patient's allergies indicates:  No Known Allergies    Past Medical History:   Diagnosis Date    HSV (herpes simplex virus) infection        History reviewed. No pertinent surgical history.    History reviewed. No pertinent family history.    Social History     Tobacco Use    Smoking status: Never     "Smokeless tobacco: Never   Substance Use Topics    Alcohol use: Not Currently    Drug use: Not Currently       Review of Systems   Constitutional:  Negative for chills and fever.   Respiratory:  Negative for shortness of breath.    Cardiovascular:  Negative for chest pain.   Musculoskeletal:  Negative for arthralgias and myalgias.   Integumentary:  Positive for rash.           Objective:      Vitals:    10/29/24 1447   BP: 120/69   BP Location: Left arm   Patient Position: Sitting   Pulse: 70   Resp: 16   Temp: 98.3 °F (36.8 °C)   TempSrc: Oral   SpO2: 98%   Weight: 63 kg (139 lb)   Height: 5' 7" (1.702 m)     Physical Exam  Vitals and nursing note reviewed.   Constitutional:       Appearance: Normal appearance.   HENT:      Head: Normocephalic and atraumatic.   Cardiovascular:      Rate and Rhythm: Normal rate and regular rhythm.      Pulses: Normal pulses.      Heart sounds: Normal heart sounds. No murmur heard.     No friction rub. No gallop.   Pulmonary:      Effort: Pulmonary effort is normal. No respiratory distress.      Breath sounds: Normal breath sounds.   Skin:     General: Skin is warm and dry.      Findings: Lesion (~5 mm annular lesions that are hyperkeratotic; widespread across abdomen, groin, back and arms) present.   Neurological:      Mental Status: He is alert.   Psychiatric:         Mood and Affect: Mood normal.         Behavior: Behavior normal.           Lab Results   Component Value Date    WBC 3.82 (L) 04/30/2024    HGB 16.7 04/30/2024    HCT 52.8 04/30/2024     04/30/2024     04/30/2024    K 4.6 04/30/2024     04/30/2024    CREATININE 1.17 04/30/2024    BUN 15 04/30/2024    CO2 32 04/30/2024      Assessment:       1. Secondary syphilis    2. Seborrheic dermatitis of scalp        Plan:         Problem List Items Addressed This Visit          Derm    Seborrheic dermatitis of scalp     Pt complains of scalp lesions  Pt has been putting various oils in his head.    Flaky, oily " skin throughout pt's scalp.     Likely seborrheic dermatitis.    2% ketoconazole shampoo twice a week.  Head and shoulders Strum Oils recommended for his other shampooing.   Pt will be following up with me in two weeks. Will see how much has improved since then.            ID    Secondary syphilis - Primary     Pt has history of syphilis.   Pt has skin lesions that are widespread and do not itch. Received incorrect antibiotic treatment when he tested positive for syphilis in January 2024. Pt's most recent RPR titer 1:2 and pt's titer before that was nonreactive.   When this skin manifestation returned, pt was given Keflex instead of penicillin a few weeks ago.    Pt was also given antivirals (not sure if it was believed that the skin lesions were viral like HSV or if they were given for the penile lesion) This penile lesion was likely an abrasion rather than an infectious process.    Pt states that his current skins lesions are similar to his previous ones that cleared up from bicillin shots 2 yrs ago. Pt received 2 bicillin shots one week apart.      Small hyperkeratotic lesions throughout abdomen, back, groin, and arms.  Palms and soles are spared.      Likely a secondary syphilis.      Pt to be treated with bicillin, three shots over the next few weeks  First shot to be today: 2.4 million units  The subsequent shots should be 2.4 million units as well.  No known allergies to penicillin drugs.  Pt to follow up with Dr. Beverly next Tuesday(11/5). He will see me again the following Thursday(11/14).  Will follow up with titers if need be. Pt told to contact his sexual partners.              Follow up in about 1 week (around 11/5/2024).    Patrice Schedule, DO     Instructed patient that if symptoms fail to improve or worsen patient should seek immediate medical attention or report to the nearest emergency department. Patient expressed verbal agreement and understanding to this plan of care.

## 2024-11-05 ENCOUNTER — OFFICE VISIT (OUTPATIENT)
Dept: FAMILY MEDICINE | Facility: CLINIC | Age: 34
End: 2024-11-05
Payer: COMMERCIAL

## 2024-11-05 VITALS
HEART RATE: 68 BPM | TEMPERATURE: 98 F | WEIGHT: 139 LBS | BODY MASS INDEX: 21.82 KG/M2 | HEIGHT: 67 IN | SYSTOLIC BLOOD PRESSURE: 115 MMHG | OXYGEN SATURATION: 99 % | RESPIRATION RATE: 16 BRPM | DIASTOLIC BLOOD PRESSURE: 70 MMHG

## 2024-11-05 DIAGNOSIS — A51.49 SECONDARY SYPHILIS: Primary | ICD-10-CM

## 2024-11-05 PROCEDURE — 3008F BODY MASS INDEX DOCD: CPT | Mod: CPTII,,, | Performed by: SPECIALIST

## 2024-11-05 PROCEDURE — 3074F SYST BP LT 130 MM HG: CPT | Mod: CPTII,,, | Performed by: SPECIALIST

## 2024-11-05 PROCEDURE — 1159F MED LIST DOCD IN RCRD: CPT | Mod: CPTII,,, | Performed by: SPECIALIST

## 2024-11-05 PROCEDURE — 3078F DIAST BP <80 MM HG: CPT | Mod: CPTII,,, | Performed by: SPECIALIST

## 2024-11-05 PROCEDURE — 1160F RVW MEDS BY RX/DR IN RCRD: CPT | Mod: CPTII,,, | Performed by: SPECIALIST

## 2024-11-05 PROCEDURE — 96372 THER/PROPH/DIAG INJ SC/IM: CPT | Mod: ,,, | Performed by: SPECIALIST

## 2024-11-05 PROCEDURE — 99213 OFFICE O/P EST LOW 20 MIN: CPT | Mod: 25,GC,, | Performed by: SPECIALIST

## 2024-11-05 NOTE — PROGRESS NOTES
Subjective:       Patient ID: Ney Russell is a 33 y.o. male.    Chief Complaint: Follow-up    32 yo M with a PMH of secondary syphilis, HSV who presents to [facility] clinic with 1 week followup. Patient was diagnosed with secondary syphilis on 10/18/24 with positive RPR (1:2 titer - active infection). He saw Dr. Barron and received bicillin IM 1 week ago (10/29/24) and is here for his second injection of bicillin. See media photo for 5mm annular lesions, hyperkeratotic across abdomen, groin, back, and arm. Denies f/c/cp/sob/n/v/d. Denies appetite, urinary, or bowel habit changes. See assessment and plan for further details.          No current outpatient medications on file.    Review of patient's allergies indicates:  No Known Allergies    Past Medical History:   Diagnosis Date    HSV (herpes simplex virus) infection        History reviewed. No pertinent surgical history.    History reviewed. No pertinent family history.    Social History     Tobacco Use    Smoking status: Never    Smokeless tobacco: Never   Substance Use Topics    Alcohol use: Not Currently    Drug use: Not Currently       Review of Systems   Constitutional:  Negative for appetite change, chills and fever.   HENT:  Negative for trouble swallowing.    Eyes:  Negative for visual disturbance.   Respiratory:  Negative for shortness of breath.    Cardiovascular:  Negative for chest pain.   Gastrointestinal:  Negative for abdominal pain, diarrhea, nausea and vomiting.   Genitourinary:  Negative for difficulty urinating and dysuria.   Musculoskeletal:  Negative for joint deformity.   Integumentary:  Positive for mole/lesion.   Neurological:  Negative for syncope and weakness.   Psychiatric/Behavioral:  Negative for sleep disturbance.          Current Medications:   Medication List with Changes/Refills   Discontinued Medications    ALBUTEROL (PROVENTIL/VENTOLIN HFA) 90 MCG/ACTUATION INHALER    Inhale 2 puffs into the lungs every 4 (four) hours as needed  "for Wheezing. Rescue       Start Date: 4/22/2024 End Date: 11/10/2024    HYDROXYZINE PAMOATE (VISTARIL) 25 MG CAP    Take 1 capsule (25 mg total) by mouth every 4 (four) hours as needed (itching).       Start Date: 11/11/2022End Date: 11/10/2024    IBUPROFEN (ADVIL,MOTRIN) 800 MG TABLET    Take 1 tablet (800 mg total) by mouth 3 (three) times daily.       Start Date: 7/23/2024 End Date: 11/10/2024    KETOCONAZOLE (NIZORAL) 2 % SHAMPOO    Apply topically twice a week.       Start Date: 10/31/2024End Date: 11/10/2024    VALACYCLOVIR (VALTREX) 1000 MG TABLET    Take 1 tablet (1,000 mg total) by mouth 3 (three) times daily. for 7 days       Start Date: 10/18/2024End Date: 11/10/2024            Objective:        Vitals:    11/05/24 1518   BP: 115/70   BP Location: Left arm   Patient Position: Sitting   Pulse: 68   Resp: 16   Temp: 98 °F (36.7 °C)   TempSrc: Oral   SpO2: 99%   Weight: 63 kg (139 lb)   Height: 5' 7" (1.702 m)       Physical Exam  Vitals and nursing note reviewed.   Constitutional:       General: He is not in acute distress.     Appearance: Normal appearance. He is not ill-appearing, toxic-appearing or diaphoretic.   HENT:      Head: Normocephalic and atraumatic.      Right Ear: External ear normal.      Left Ear: External ear normal.      Mouth/Throat:      Pharynx: Oropharynx is clear.   Eyes:      General: No scleral icterus.        Right eye: No discharge.         Left eye: No discharge.      Conjunctiva/sclera: Conjunctivae normal.      Pupils: Pupils are equal, round, and reactive to light.   Cardiovascular:      Rate and Rhythm: Regular rhythm. Tachycardia present.      Pulses: Normal pulses.      Heart sounds: Normal heart sounds. No murmur heard.     No friction rub. No gallop.   Pulmonary:      Effort: Pulmonary effort is normal. No respiratory distress.      Breath sounds: Normal breath sounds. No wheezing, rhonchi or rales.   Abdominal:      General: There is no distension.   Musculoskeletal:    "      General: No swelling or tenderness.      Cervical back: Neck supple.      Right lower leg: No edema.      Left lower leg: No edema.   Skin:     General: Skin is warm and dry.      Coloration: Skin is not jaundiced.      Findings: Lesion present.      Comments: See media photo for 5mm annular lesions, hyperkeratotic across abdomen, groin, back, and arm.   Neurological:      General: No focal deficit present.      Mental Status: He is alert.      Sensory: No sensory deficit.      Gait: Gait normal.   Psychiatric:         Mood and Affect: Mood normal.         Behavior: Behavior normal.             Lab Results   Component Value Date    WBC 3.82 (L) 04/30/2024    HGB 16.7 04/30/2024    HCT 52.8 04/30/2024     04/30/2024     04/30/2024    K 4.6 04/30/2024     04/30/2024    CREATININE 1.17 04/30/2024    BUN 15 04/30/2024    CO2 32 04/30/2024      Assessment:       1. Secondary syphilis        Plan:         Problem List Items Addressed This Visit          ID    Secondary syphilis - Primary     Monitor  34 yo M with a PMH of secondary syphilis, HSV who presents to [facility] clinic with 1 week followup. Patient was diagnosed with secondary syphilis on 10/18/24 with positive RPR (1:2 titer - active infection). He saw Dr. Barron and received bicillin IM 1 week ago (10/29/24) and is here for his second injection of bicillin. See media photo for 5mm annular lesions, hyperkeratotic across abdomen, groin, back, and arm. Denies f/c/cp/sob/n/v/d. Denies appetite, urinary, or bowel habit changes    Evaluate  See media photo for 5mm annular lesions, hyperkeratotic across abdomen, groin, back, and arm. Otherwise unremarkable exam - see PE    Assess  Secondary syphilis    Treat  Bicillin 2.4 million units IM for second dose, will complete once weekly third dose next week at f/u 11/14 with Dr. Barron              Follow up in about 1 week (around 11/12/2024) for syphilis.    Nancy Beverly DO     Instructed patient  that if symptoms fail to improve or worsen patient should seek immediate medical attention or report to the nearest emergency department. Patient expressed verbal agreement and understanding to this plan of care.

## 2024-11-05 NOTE — LETTER
November 5, 2024      [facility]  [address]  [place] [address]  Phone: [phone]  Fax: [phone]       Patient: Ney Russell   YOB: 1990  Date of Visit: 11/05/2024    To Whom It May Concern:    Usman Russell  was at [facility] on 11/05/2024. The patient may return to work/school on 11/6/24 with no restrictions. If you have any questions or concerns, or if I can be of further assistance, please do not hesitate to contact me.    Sincerely,    Nancy Beverly, DO

## 2024-11-10 NOTE — ASSESSMENT & PLAN NOTE
Monitor  32 yo M with a PMH of secondary syphilis, HSV who presents to [facility] clinic with 1 week followup. Patient was diagnosed with secondary syphilis on 10/18/24 with positive RPR (1:2 titer - active infection). He saw Dr. Barron and received bicillin IM 1 week ago (10/29/24) and is here for his second injection of bicillin. See media photo for 5mm annular lesions, hyperkeratotic across abdomen, groin, back, and arm. Denies f/c/cp/sob/n/v/d. Denies appetite, urinary, or bowel habit changes    Evaluate  See media photo for 5mm annular lesions, hyperkeratotic across abdomen, groin, back, and arm. Otherwise unremarkable exam - see PE    Assess  Secondary syphilis    Treat  Bicillin 2.4 million units IM for second dose, will complete once weekly third dose next week at f/u 11/14 with Dr. Barron

## 2024-11-14 ENCOUNTER — OFFICE VISIT (OUTPATIENT)
Dept: FAMILY MEDICINE | Facility: CLINIC | Age: 34
End: 2024-11-14
Payer: COMMERCIAL

## 2024-11-14 VITALS
HEIGHT: 67 IN | RESPIRATION RATE: 18 BRPM | TEMPERATURE: 98 F | HEART RATE: 66 BPM | OXYGEN SATURATION: 98 % | DIASTOLIC BLOOD PRESSURE: 69 MMHG | BODY MASS INDEX: 20.88 KG/M2 | WEIGHT: 133 LBS | SYSTOLIC BLOOD PRESSURE: 123 MMHG

## 2024-11-14 DIAGNOSIS — L21.9 SEBORRHEIC DERMATITIS OF SCALP: ICD-10-CM

## 2024-11-14 DIAGNOSIS — A51.49 SECONDARY SYPHILIS: ICD-10-CM

## 2024-11-14 DIAGNOSIS — Z23 NEED FOR TETANUS BOOSTER: Primary | ICD-10-CM

## 2024-11-14 RX ORDER — VALACYCLOVIR HYDROCHLORIDE 500 MG/1
500 TABLET, FILM COATED ORAL 2 TIMES DAILY
COMMUNITY

## 2024-11-14 NOTE — PROGRESS NOTES
Subjective:       Patient ID: Ney Russell is a 34 y.o. male.    Chief Complaint: Follow-up (Room 10 second 2 week f/u )    33 yo male following up for his 3rd bicillin shot for secondary syphilis. Pt has history of syphilis infection about 2 yrs ago  that was subsequently treated. However, he has had recent titer suggestive of a new/ongoing syphilis infection. Pt also had a skin rash that he states is similar to the one he had previously and that it regressed after receiving a few antibiotic shots.   Pt states that he hasn't noticed much improvement to his rash but cannot recall how long it took his rash to improve last time.    Pt has begun to use the twice a week ketoconazole shampoo that I prescribed him but he has yet to begin the [place] [employer]ils shampoo for everyday shampooing. Pt states that he will get it shortly, either at the grocery store or order on [employer] should he not be able to find it.    Pt cannot recall the last time he got a tetanus shot. It has been several years and likely over a decade. Pt weary of vaccination but agreed to receive it. TDAP shot given today in office.      Current Outpatient Medications:     valACYclovir (VALTREX) 500 MG tablet, Take 500 mg by mouth 2 (two) times daily., Disp: , Rfl:     Review of patient's allergies indicates:  No Known Allergies    Past Medical History:   Diagnosis Date    HSV (herpes simplex virus) infection        History reviewed. No pertinent surgical history.    History reviewed. No pertinent family history.    Social History     Tobacco Use    Smoking status: Never    Smokeless tobacco: Never   Substance Use Topics    Alcohol use: Not Currently    Drug use: Not Currently       Review of Systems   Constitutional:  Negative for chills and fever.   Respiratory:  Negative for shortness of breath and wheezing.    Cardiovascular:  Negative for chest pain and palpitations.   Gastrointestinal:  Negative for diarrhea, nausea and vomiting.   Integumentary:   "Positive for rash (rash still present).           Objective:      Vitals:    11/14/24 1536   BP: 123/69   BP Location: Left arm   Patient Position: Sitting   Pulse: 66   Resp: 18   Temp: 97.6 °F (36.4 °C)   TempSrc: Oral   SpO2: 98%   Weight: 60.3 kg (133 lb)   Height: 5' 7" (1.702 m)     Physical Exam  Vitals and nursing note reviewed.   Constitutional:       General: He is not in acute distress.     Appearance: Normal appearance. He is normal weight.   HENT:      Head: Normocephalic and atraumatic.   Cardiovascular:      Rate and Rhythm: Normal rate and regular rhythm.      Pulses: Normal pulses.      Heart sounds: Normal heart sounds. No murmur heard.     No friction rub. No gallop.   Pulmonary:      Effort: Pulmonary effort is normal. No respiratory distress.      Breath sounds: Normal breath sounds. No wheezing.   Abdominal:      General: Bowel sounds are normal.      Tenderness: There is no abdominal tenderness.   Skin:     General: Skin is warm and dry.      Findings: Rash (minimal improvement; annular lesions appear less keratotic but about the same size) present.   Neurological:      Mental Status: He is alert.   Psychiatric:         Mood and Affect: Mood normal.         Behavior: Behavior normal.       Lab Results   Component Value Date    WBC 3.82 (L) 04/30/2024    HGB 16.7 04/30/2024    HCT 52.8 04/30/2024     04/30/2024     04/30/2024    K 4.6 04/30/2024     04/30/2024    CREATININE 1.17 04/30/2024    BUN 15 04/30/2024    CO2 32 04/30/2024      Assessment:       1. Need for tetanus booster    2. Secondary syphilis    3. Seborrheic dermatitis of scalp        Plan:         Problem List Items Addressed This Visit          Derm    Seborrheic dermatitis of scalp     Has yet to initiate the [place] [employer]ils for everyday shampooing.  Pt was previously putting in various oils in hair. Told him that these oils often feed the yeast that causes seborrheic dermatitis.    Physical exam still shows " flaking of scalp. Hair is visibly less oily, however.    Seborrheic dermatitis that should improve with ketoconazole use.    Pt to continue ketoconazole shampoo twice a week.  Use [employer] Oils for everyday shampooing.  Follow up with me if it should not resolve over the next few weeks.              ID    Secondary syphilis     Pt here for his 3rd bicillin shot.  Pts titers suggested a new/ongoing infection.  Skin rash on trunk similar to previous syphilis infection.    Physical exam reveals the same annular, hyperkeratotic lesions that have regressed minimally.    Likely secondary syphilis with skin manifestations that should resolve sometime after third bicillin injection.    3rd and final 2.4 million bicillin dose.   Pt to follow up in 6 months for retesting for syphilis.  Pt should notify me if his skin rash should not regress in the next few weeks.            Other Visit Diagnoses       Need for tetanus booster    -  Primary    Relevant Medications    Tdap (BOOSTRIX) vaccine injection 0.5 mL (Completed)              Follow up in about 6 months (around 5/14/2025).    Patrice Schedule, DO     Instructed patient that if symptoms fail to improve or worsen patient should seek immediate medical attention or report to the nearest emergency department. Patient expressed verbal agreement and understanding to this plan of care.

## 2024-12-12 NOTE — ASSESSMENT & PLAN NOTE
Pt here for his 3rd bicillin shot.  Pts titers suggested a new/ongoing infection.  Skin rash on trunk similar to previous syphilis infection.    Physical exam reveals the same annular, hyperkeratotic lesions that have regressed minimally.    Likely secondary syphilis with skin manifestations that should resolve sometime after third bicillin injection.    3rd and final 2.4 million bicillin dose.   Pt to follow up in 6 months for retesting for syphilis.  Pt should notify me if his skin rash should not regress in the next few weeks.

## 2024-12-12 NOTE — ASSESSMENT & PLAN NOTE
Has yet to initiate the [place] [employer] for everyday shampooing.  Pt was previously putting in various oils in hair. Told him that these oils often feed the yeast that causes seborrheic dermatitis.    Physical exam still shows flaking of scalp. Hair is visibly less oily, however.    Seborrheic dermatitis that should improve with ketoconazole use.    Pt to continue ketoconazole shampoo twice a week.  Use Royal Oils for everyday shampooing.  Follow up with me if it should not resolve over the next few weeks.

## 2025-04-01 ENCOUNTER — HOSPITAL ENCOUNTER (EMERGENCY)
Facility: HOSPITAL | Age: 35
Discharge: HOME | End: 2025-04-01
Payer: COMMERCIAL

## 2025-04-01 VITALS
HEART RATE: 70 BPM | TEMPERATURE: 98 F | RESPIRATION RATE: 17 BRPM | OXYGEN SATURATION: 97 % | WEIGHT: 136 LBS | BODY MASS INDEX: 21.86 KG/M2 | SYSTOLIC BLOOD PRESSURE: 121 MMHG | DIASTOLIC BLOOD PRESSURE: 78 MMHG | HEIGHT: 66 IN

## 2025-04-01 DIAGNOSIS — R05.8 PRODUCTIVE COUGH: Primary | ICD-10-CM

## 2025-04-01 PROCEDURE — 99283 EMERGENCY DEPT VISIT LOW MDM: CPT

## 2025-04-01 RX ORDER — AZITHROMYCIN 250 MG/1
250 TABLET, FILM COATED ORAL DAILY
Qty: 6 TABLET | Refills: 0 | Status: SHIPPED | OUTPATIENT
Start: 2025-04-01

## 2025-04-01 RX ORDER — CETIRIZINE HYDROCHLORIDE 10 MG/1
10 TABLET ORAL DAILY
Qty: 30 TABLET | Refills: 0 | Status: SHIPPED | OUTPATIENT
Start: 2025-04-01 | End: 2026-04-01

## 2025-04-01 RX ORDER — FLUTICASONE PROPIONATE 50 MCG
1 SPRAY, SUSPENSION (ML) NASAL 2 TIMES DAILY PRN
Qty: 15 G | Refills: 0 | Status: SHIPPED | OUTPATIENT
Start: 2025-04-01

## 2025-04-01 NOTE — Clinical Note
"Ney Wing" Yvonne was seen and treated in our emergency department on 4/1/2025.  He may return to work on 04/04/2025.       If you have any questions or concerns, please don't hesitate to call.      Kelsey Ross, KADI"

## 2025-04-01 NOTE — Clinical Note
"Ney Wing" Yvonne was seen and treated in our emergency department on 4/1/2025.  He may return to work on 04/03/2025.       If you have any questions or concerns, please don't hesitate to call.      Kelsey Ross, KADI"

## 2025-04-01 NOTE — ED PROVIDER NOTES
Encounter Date: 4/1/2025       History     Chief Complaint   Patient presents with    Cough    Nasal Congestion     Patient presents to the ED with c/o cough, chest congestion, and nasal congestion that has been going on for a week.     34-year-old male presents to the emergency department to be evaluated for nasal congestion and productive cough that began a couple of weeks ago    The history is provided by the patient.   Cough  This is a new problem. The current episode started several days ago. Pertinent negatives include no chest pain, no chills, no sweats, no weight loss, no ear congestion, no ear pain, no headaches, no rhinorrhea, no sore throat, no myalgias, no shortness of breath, no wheezing and no eye redness.     Review of patient's allergies indicates:  No Known Allergies  Past Medical History:   Diagnosis Date    HSV (herpes simplex virus) infection      No past surgical history on file.  No family history on file.  Social History[1]  Review of Systems   Constitutional:  Negative for chills and weight loss.   HENT:  Negative for ear pain, rhinorrhea and sore throat.    Eyes:  Negative for redness.   Respiratory:  Positive for cough. Negative for shortness of breath and wheezing.    Cardiovascular:  Negative for chest pain.   Musculoskeletal:  Negative for myalgias.   Neurological:  Negative for headaches.   All other systems reviewed and are negative.      Physical Exam     Initial Vitals [04/01/25 1215]   BP Pulse Resp Temp SpO2   121/78 70 17 98.3 °F (36.8 °C) 97 %      MAP       --         Physical Exam    Vitals reviewed.  Constitutional: He appears well-developed and well-nourished.   HENT: Mouth/Throat: Oropharynx is clear and moist.   Neck: Neck supple.   Cardiovascular:  Normal rate and regular rhythm.           Pulmonary/Chest: Breath sounds normal.   Musculoskeletal:      Cervical back: Neck supple.     Neurological: He is alert and oriented to person, place, and time. GCS score is 15. GCS eye  subscore is 4. GCS verbal subscore is 5. GCS motor subscore is 6.   Skin: Skin is warm and dry. Capillary refill takes less than 2 seconds.   Psychiatric: He has a normal mood and affect.         Medical Screening Exam   See Full Note    ED Course   Procedures  Labs Reviewed - No data to display       Imaging Results    None          Medications - No data to display  Medical Decision Making  34-year-old male presents to the emergency department to be evaluated for nasal congestion and productive cough that began a couple of weeks ago  Diagnosis:  Productive cough greater than 10 days  Prescribed Zyrtec, Flonase and Zithromax    Risk  OTC drugs.  Prescription drug management.                                      Clinical Impression:   Final diagnoses:  [R05.8] Productive cough (Primary)        ED Disposition Condition    Discharge Stable          ED Prescriptions       Medication Sig Dispense Start Date End Date Auth. Provider    azithromycin (Z-DELILAH) 250 MG tablet Take 1 tablet (250 mg total) by mouth once daily. Take first 2 tablets together, then 1 every day until finished. 6 tablet 4/1/2025 -- Kelsey Ross FNP    cetirizine (ZYRTEC) 10 MG tablet Take 1 tablet (10 mg total) by mouth once daily. 30 tablet 4/1/2025 4/1/2026 Kelsey Ross FNP    fluticasone propionate (FLONASE) 50 mcg/actuation nasal spray 1 spray (50 mcg total) by Each Nostril route 2 (two) times daily as needed. 15 g 4/1/2025 -- Kelsey Ross FNP          Follow-up Information    None            [1]   Social History  Tobacco Use    Smoking status: Never    Smokeless tobacco: Never   Substance Use Topics    Alcohol use: Not Currently    Drug use: Not Currently        Kelsey Ross FNP  04/01/25 8541

## 2025-04-14 ENCOUNTER — HOSPITAL ENCOUNTER (EMERGENCY)
Facility: HOSPITAL | Age: 35
Discharge: HOME | End: 2025-04-14

## 2025-04-14 VITALS
HEIGHT: 66 IN | OXYGEN SATURATION: 97 % | RESPIRATION RATE: 17 BRPM | HEART RATE: 71 BPM | DIASTOLIC BLOOD PRESSURE: 76 MMHG | SYSTOLIC BLOOD PRESSURE: 121 MMHG | BODY MASS INDEX: 23.3 KG/M2 | TEMPERATURE: 98 F | WEIGHT: 145 LBS

## 2025-04-14 DIAGNOSIS — R05.9 COUGH, UNSPECIFIED TYPE: Primary | ICD-10-CM

## 2025-04-14 DIAGNOSIS — R06.09 EXERTIONAL DYSPNEA: ICD-10-CM

## 2025-04-14 PROCEDURE — 99283 EMERGENCY DEPT VISIT LOW MDM: CPT

## 2025-04-14 RX ORDER — ALBUTEROL SULFATE 90 UG/1
1-2 INHALANT RESPIRATORY (INHALATION) EVERY 6 HOURS PRN
Qty: 8 G | Refills: 0 | Status: SHIPPED | OUTPATIENT
Start: 2025-04-14 | End: 2026-04-14

## 2025-04-14 NOTE — DISCHARGE INSTRUCTIONS
Albuterol inhaler as prescribed and directed.  Obtain and Follow up with a primary care provider in 2 days. Return to the emergency department for any increase in symptoms or for any other new or worrisome symptoms.

## 2025-04-14 NOTE — ED PROVIDER NOTES
Encounter Date: 4/14/2025       History     Chief Complaint   Patient presents with    Cough     Pt reports having bronchitis and needing a new pump. He reports a cough and feeling like he can't get a full breath.      34-year-old male presents to the emergency department to be evaluated for a cough and reports of shortness of breath on exertion that he is experienced over the last days.  He states he was evaluated for similar symptoms 6-8 months ago, and prescribed an inhaler.  This has a assisted with improving his symptoms, but it is now empty.  He requests a refill of the prescription for his inhaler.  He denies any shortness of breath or wheezing on arrival to the emergency department.    The history is provided by the patient.     Review of patient's allergies indicates:  No Known Allergies  Past Medical History:   Diagnosis Date    HSV (herpes simplex virus) infection      No past surgical history on file.  No family history on file.  Social History[1]  Review of Systems   Constitutional: Negative.    HENT:  Positive for congestion.    Eyes: Negative.    Respiratory:  Positive for cough and shortness of breath (On exertion). Negative for wheezing and stridor.    Cardiovascular:  Negative for chest pain.   Gastrointestinal: Negative.    Genitourinary: Negative.    Musculoskeletal: Negative.    Skin: Negative.    Allergic/Immunologic: Negative.    Neurological: Negative.    Hematological: Negative.    Psychiatric/Behavioral: Negative.         Physical Exam     Initial Vitals [04/14/25 1237]   BP Pulse Resp Temp SpO2   121/76 71 17 98.4 °F (36.9 °C) 97 %      MAP       --         Physical Exam    Vitals reviewed.  Constitutional: He appears well-developed and well-nourished. He is not diaphoretic. No distress.   HENT:   Head: Normocephalic and atraumatic.   Right Ear: External ear normal.   Left Ear: External ear normal.   Nose: Nose normal. Mouth/Throat: Oropharynx is clear and moist. No oropharyngeal exudate.    Eyes: Conjunctivae and EOM are normal. Pupils are equal, round, and reactive to light. Right eye exhibits no discharge. Left eye exhibits no discharge. No scleral icterus.   Neck: No tracheal deviation present. No JVD present.   Normal range of motion.  Cardiovascular:  Normal rate, regular rhythm and intact distal pulses.           Pulmonary/Chest: Breath sounds normal. No stridor. No respiratory distress. He has no wheezes. He has no rhonchi. He has no rales. He exhibits no tenderness.   Abdominal: Abdomen is soft. Bowel sounds are normal. He exhibits no distension. There is no abdominal tenderness. There is no rebound and no guarding.   Musculoskeletal:         General: No tenderness or edema. Normal range of motion.      Cervical back: Normal range of motion.     Lymphadenopathy:     He has no cervical adenopathy.   Neurological: He is alert and oriented to person, place, and time. He has normal strength. No cranial nerve deficit or sensory deficit. GCS score is 15. GCS eye subscore is 4. GCS verbal subscore is 5. GCS motor subscore is 6.   Skin: Skin is warm and dry. Capillary refill takes less than 2 seconds.   Psychiatric: He has a normal mood and affect. Thought content normal.         Medical Screening Exam   See Full Note    ED Course   Procedures  Labs Reviewed - No data to display       Imaging Results    None          Medications - No data to display  Medical Decision Making  34-year-old male presents to the emergency department to be evaluated for a cough and reports of shortness of breath on exertion that he is experienced over the last days.  He states he was evaluated for similar symptoms 6-8 months ago, and prescribed an inhaler.  This has a assisted with improving his symptoms, but it is now empty.  He requests a refill of the prescription for his inhaler.  He denies any shortness of breath or wheezing on arrival to the emergency department.    Diagnosis: Cough; shortness of breath on  exertion    No shortness of breath or cough upon examination.    Prescribed albuterol inhaler and encouraged to follow up with a primary care provider within the next 1-2 days.      Risk  Prescription drug management.                                      Clinical Impression:   Final diagnoses:  [R05.9] Cough, unspecified type (Primary)  [R06.09] Exertional dyspnea        ED Disposition Condition    Discharge Stable          ED Prescriptions       Medication Sig Dispense Start Date End Date Auth. Provider    albuterol (PROVENTIL/VENTOLIN HFA) 90 mcg/actuation inhaler Inhale 1-2 puffs into the lungs every 6 (six) hours as needed for Wheezing. Rescue 8 g 4/14/2025 4/14/2026 Gabe Wooten FNP          Follow-up Information    None              [1]   Social History  Tobacco Use    Smoking status: Never    Smokeless tobacco: Never   Substance Use Topics    Alcohol use: Not Currently    Drug use: Not Currently        Gabe Wooten FNP  04/14/25 [id number]

## 2025-04-14 NOTE — Clinical Note
"Ney Wing" Yvonne was seen and treated in our emergency department on 4/14/2025.  He may return to work on 04/15/2025.       If you have any questions or concerns, please don't hesitate to call.      Gabe Wooten FNP"

## 2025-04-21 ENCOUNTER — HOSPITAL ENCOUNTER (EMERGENCY)
Facility: HOSPITAL | Age: 35
Discharge: HOME | End: 2025-04-21

## 2025-04-21 VITALS
DIASTOLIC BLOOD PRESSURE: 76 MMHG | HEIGHT: 66 IN | HEART RATE: 61 BPM | WEIGHT: 140 LBS | SYSTOLIC BLOOD PRESSURE: 128 MMHG | BODY MASS INDEX: 22.5 KG/M2 | OXYGEN SATURATION: 96 % | RESPIRATION RATE: 15 BRPM | TEMPERATURE: 98 F

## 2025-04-21 DIAGNOSIS — R53.83 FATIGUE, UNSPECIFIED TYPE: Primary | ICD-10-CM

## 2025-04-21 LAB
AMPHET UR QL SCN: NEGATIVE
ANION GAP SERPL CALCULATED.3IONS-SCNC: 14 MMOL/L (ref 7–16)
BARBITURATES UR QL SCN: NEGATIVE
BASOPHILS # BLD AUTO: 0.04 K/UL (ref 0–0.2)
BASOPHILS NFR BLD AUTO: 1 % (ref 0–1)
BENZODIAZ METAB UR QL SCN: NEGATIVE
BILIRUB UR QL STRIP: NEGATIVE
BUN SERPL-MCNC: 12 MG/DL (ref 9–21)
BUN/CREAT SERPL: 10 (ref 6–20)
CALCIUM SERPL-MCNC: 9.4 MG/DL (ref 8.4–10.2)
CANNABINOIDS UR QL SCN: NEGATIVE
CHLORIDE SERPL-SCNC: 103 MMOL/L (ref 98–107)
CLARITY UR: CLEAR
CO2 SERPL-SCNC: 28 MMOL/L (ref 22–29)
COCAINE UR QL SCN: NEGATIVE
COLOR UR: YELLOW
CREAT SERPL-MCNC: 1.15 MG/DL (ref 0.72–1.25)
DIFFERENTIAL METHOD BLD: ABNORMAL
EGFR (NO RACE VARIABLE) (RUSH/TITUS): 86 ML/MIN/1.73M2
EOSINOPHIL # BLD AUTO: 0.03 K/UL (ref 0–0.5)
EOSINOPHIL NFR BLD AUTO: 0.8 % (ref 1–4)
ERYTHROCYTE [DISTWIDTH] IN BLOOD BY AUTOMATED COUNT: 13.3 % (ref 11.5–14.5)
GLUCOSE SERPL-MCNC: 78 MG/DL (ref 74–100)
GLUCOSE UR STRIP-MCNC: NORMAL MG/DL
HCT VFR BLD AUTO: 51.4 % (ref 40–54)
HGB BLD-MCNC: 16.4 G/DL (ref 13.5–18)
IMM GRANULOCYTES # BLD AUTO: 0.05 K/UL (ref 0–0.04)
IMM GRANULOCYTES NFR BLD: 1.3 % (ref 0–0.4)
KETONES UR STRIP-SCNC: NEGATIVE MG/DL
LEUKOCYTE ESTERASE UR QL STRIP: NEGATIVE
LYMPHOCYTES # BLD AUTO: 1.25 K/UL (ref 1–4.8)
LYMPHOCYTES NFR BLD AUTO: 32.1 % (ref 27–41)
MCH RBC QN AUTO: 26.1 PG (ref 27–31)
MCHC RBC AUTO-ENTMCNC: 31.9 G/DL (ref 32–36)
MCV RBC AUTO: 81.8 FL (ref 80–96)
MONOCYTES # BLD AUTO: 0.55 K/UL (ref 0–0.8)
MONOCYTES NFR BLD AUTO: 14.1 % (ref 2–6)
MPC BLD CALC-MCNC: 10.7 FL (ref 9.4–12.4)
NEUTROPHILS # BLD AUTO: 1.98 K/UL (ref 1.8–7.7)
NEUTROPHILS NFR BLD AUTO: 50.7 % (ref 53–65)
NITRITE UR QL STRIP: NEGATIVE
NRBC # BLD AUTO: 0 X10E3/UL
NRBC, AUTO (.00): 0 %
OPIATES UR QL SCN: NEGATIVE
PCP UR QL SCN: NEGATIVE
PH UR STRIP: 7 PH UNITS
PLATELET # BLD AUTO: 230 K/UL (ref 150–400)
POTASSIUM SERPL-SCNC: 4.1 MMOL/L (ref 3.5–5.1)
PROT UR QL STRIP: NEGATIVE
RBC # BLD AUTO: 6.28 M/UL (ref 4.6–6.2)
RBC # UR STRIP: NEGATIVE /UL
SODIUM SERPL-SCNC: 141 MMOL/L (ref 136–145)
SP GR UR STRIP: 1.02
UROBILINOGEN UR STRIP-ACNC: 3 MG/DL
WBC # BLD AUTO: 3.9 K/UL (ref 4.5–11)

## 2025-04-21 PROCEDURE — 80307 DRUG TEST PRSMV CHEM ANLYZR: CPT | Performed by: NURSE PRACTITIONER

## 2025-04-21 PROCEDURE — 36415 COLL VENOUS BLD VENIPUNCTURE: CPT | Performed by: NURSE PRACTITIONER

## 2025-04-21 PROCEDURE — 80048 BASIC METABOLIC PNL TOTAL CA: CPT | Performed by: NURSE PRACTITIONER

## 2025-04-21 PROCEDURE — 99283 EMERGENCY DEPT VISIT LOW MDM: CPT

## 2025-04-21 PROCEDURE — 85025 COMPLETE CBC W/AUTO DIFF WBC: CPT | Performed by: NURSE PRACTITIONER

## 2025-04-21 PROCEDURE — 81003 URINALYSIS AUTO W/O SCOPE: CPT | Performed by: NURSE PRACTITIONER

## 2025-04-21 RX ORDER — MULTIVITAMIN
1 TABLET ORAL DAILY
Qty: 30 TABLET | Refills: 0 | Status: SHIPPED | OUTPATIENT
Start: 2025-04-21 | End: 2025-05-21

## 2025-04-21 NOTE — ED PROVIDER NOTES
"Encounter Date: 4/21/2025       History     Chief Complaint   Patient presents with    Fatigue     Pt states "I feel like I'm dehydrated. When I wake up I can barley move."      35 y/o AAM presents to the emergency department with c/o feeling like he is dehydrated. Patient states when he woke up this morning he was fatigued and felt "stiff". He denies fever, chills, pain. He has had no nausea, vomiting, diarrhea. He denies muscle or body aches. He denies over heating or over exertion. He has had nothing for his symptoms. He knows of no particular exacerbating or remitting factors. He denies nicotine, alcohol or illicit drug use.       The history is provided by the patient.     Review of patient's allergies indicates:  No Known Allergies  Past Medical History:   Diagnosis Date    HSV (herpes simplex virus) infection      History reviewed. No pertinent surgical history.  No family history on file.  Social History[1]  Review of Systems   All other systems reviewed and are negative.      Physical Exam     Initial Vitals   BP Pulse Resp Temp SpO2   04/21/25 1050 04/21/25 1048 04/21/25 1048 04/21/25 1048 04/21/25 1048   121/78 77 16 98.3 °F (36.8 °C) 97 %      MAP       --                Physical Exam    Constitutional: He appears well-developed and well-nourished. He is cooperative.  Non-toxic appearance.   HENT: Mouth/Throat: Oropharynx is clear and moist and mucous membranes are normal.   Cardiovascular:  Normal rate, regular rhythm and normal heart sounds.           Pulmonary/Chest: Effort normal and breath sounds normal.   Abdominal: Abdomen is soft. Bowel sounds are normal. There is no abdominal tenderness.     Neurological: He is alert and oriented to person, place, and time. He has normal strength. GCS eye subscore is 4. GCS verbal subscore is 5. GCS motor subscore is 6.   Skin: Skin is warm, dry and intact. Capillary refill takes less than 2 seconds.         Medical Screening Exam   See Full Note    ED Course "   Procedures  Labs Reviewed   URINALYSIS, REFLEX TO URINE CULTURE - Abnormal       Result Value    Color, UA Yellow      Clarity, UA Clear      pH, UA 7.0      Leukocytes, UA Negative      Nitrites, UA Negative      Protein, UA Negative      Glucose, UA Normal      Ketones, UA Negative      Urobilinogen, UA 3 (*)     Bilirubin, UA Negative      Blood, UA Negative      Specific Gravity, UA 1.023     CBC WITH DIFFERENTIAL - Abnormal    WBC 3.90 (*)     RBC 6.28 (*)     Hemoglobin 16.4      Hematocrit 51.4      MCV 81.8      MCH 26.1 (*)     MCHC 31.9 (*)     RDW 13.3      Platelet Count 230      MPV 10.7      Neutrophils % 50.7 (*)     Lymphocytes % 32.1      Monocytes % 14.1 (*)     Eosinophils % 0.8 (*)     Basophils % 1.0      Immature Granulocytes % 1.3 (*)     nRBC, Auto 0.0      Neutrophils, Abs 1.98      Lymphocytes, Absolute 1.25      Monocytes, Absolute 0.55      Eosinophils, Absolute 0.03      Basophils, Absolute 0.04      Immature Granulocytes, Absolute 0.05 (*)     nRBC, Absolute 0.00      Diff Type Auto     BASIC METABOLIC PANEL - Normal    Sodium 141      Potassium 4.1      Chloride 103      CO2 28      Anion Gap 14      Glucose 78      BUN 12      Creatinine 1.15      BUN/Creatinine Ratio 10      Calcium 9.4      eGFR 86     DRUG SCREEN, URINE (BEAKER) - Normal    Barbiturates, Urine Negative      Benzodiazepine, Urine Negative      Opiates, Urine Negative      Phencyclidine, Urine Negative      Amphetamine, Urine Negative      Cannabinoid, Urine Negative      Cocaine, Urine Negative      Narrative:     This screen includes the following classes of drugs at the listed cut-off:    Benzodiazepines 200 ng/ml  Cocaine metabolite 300 ng/ml  Opiates 2000 ng/ml  Barbiturates 200 ng/ml  Amphetamines 500 ng/ml  Marijuana metabs (THC) 50 ng/ml  Phencyclidine (PCP) 25 ng/ml    This is a screening test. If results do not correlate with clinical presentation, then a confirmatory send out test is advised.   CBC W/  "AUTO DIFFERENTIAL    Narrative:     The following orders were created for panel order CBC auto differential.  Procedure                               Abnormality         Status                     ---------                               -----------         ------                     CBC with Differential[[id number]]       Abnormal            Final result                 Please view results for these tests on the individual orders.          Imaging Results    None          Medications - No data to display  Medical Decision Making  35 y/o AAM presents to the emergency department with c/o feeling like he is dehydrated. Patient states when he woke up this morning he was fatigued and felt "stiff". He denies fever, chills, pain. He has had no nausea, vomiting, diarrhea. He denies muscle or body aches. He denies over heating or over exertion. He has had nothing for his symptoms. He knows of no particular exacerbating or remitting factors. He denies nicotine, alcohol or illicit drug use.       Problems Addressed:  Fatigue, unspecified type:     Details: Patient is afebrile, non toxic appearing. Exam unremarkable. Work up unremarkable or not clinically significant. Rx Multivitamin; counseled on use and supportive measures. Discussed healthy lifestyle. Follow up instructions given. Warning s/s discussed and return precautions given; the patient has v/u.    Amount and/or Complexity of Data Reviewed  Labs: ordered.    Risk  OTC drugs.  Prescription drug management.                                      Clinical Impression:   Final diagnoses:  [R53.83] Fatigue, unspecified type (Primary)        ED Disposition Condition    Discharge Good          ED Prescriptions       Medication Sig Dispense Start Date End Date Auth. Provider    multivitamin (THERAGRAN) per tablet Take 1 tablet by mouth once daily. 30 tablet 4/21/2025 5/21/2025 Amarilis Fontaine FNP          Follow-up Information       Follow up With Specialties Details Why Contact " Info    Schedule, DO [facility] Schedule an appointment as soon as possible for a visit   [address] [facility] [address]  [phone]                   [1]   Social History  Tobacco Use    Smoking status: Never    Smokeless tobacco: Never   Substance Use Topics    Alcohol use: Not Currently    Drug use: Not Currently        Amarilis Fontaine FNP  04/25/25 0751

## 2025-04-21 NOTE — DISCHARGE INSTRUCTIONS
Use prescriptions as directed. Follow a healthy diet. Get plenty of rest and exercise. Follow up with your primary care provider this week for recheck and continued care and management.

## 2025-04-21 NOTE — Clinical Note
"Ney Wing" Yvonne was seen and treated in our emergency department on 4/21/2025.  He may return to work on 04/22/2025.       If you have any questions or concerns, please don't hesitate to call.      Daphnie YOU    "

## 2025-04-22 ENCOUNTER — PATIENT OUTREACH (OUTPATIENT)
Facility: OTHER | Age: 35
End: 2025-04-22

## 2025-04-24 NOTE — PROGRESS NOTES
ED navigator third attempt to contact patient to assist with scheudling a post ED 7 day follow up with PCP. Unable to reach patient at this time or leave a message. ED navigator will close encounter at this time.    Angeles Kaur ED Navigator   [phone]

## 2025-05-05 ENCOUNTER — HOSPITAL ENCOUNTER (EMERGENCY)
Facility: HOSPITAL | Age: 35
Discharge: HOME | End: 2025-05-05
Payer: COMMERCIAL

## 2025-05-05 VITALS
HEIGHT: 66 IN | WEIGHT: 140 LBS | RESPIRATION RATE: 16 BRPM | HEART RATE: 65 BPM | DIASTOLIC BLOOD PRESSURE: 73 MMHG | BODY MASS INDEX: 22.5 KG/M2 | OXYGEN SATURATION: 96 % | TEMPERATURE: 99 F | SYSTOLIC BLOOD PRESSURE: 119 MMHG

## 2025-05-05 DIAGNOSIS — M79.602 PAIN IN BOTH UPPER EXTREMITIES: Primary | ICD-10-CM

## 2025-05-05 DIAGNOSIS — M79.601 PAIN IN BOTH UPPER EXTREMITIES: Primary | ICD-10-CM

## 2025-05-05 PROCEDURE — 99283 EMERGENCY DEPT VISIT LOW MDM: CPT

## 2025-05-05 RX ORDER — IBUPROFEN 800 MG/1
800 TABLET ORAL 3 TIMES DAILY
Qty: 15 TABLET | Refills: 0 | Status: SHIPPED | OUTPATIENT
Start: 2025-05-05 | End: 2025-05-10

## 2025-05-05 NOTE — ED PROVIDER NOTES
Encounter Date: 5/5/2025       History     Chief Complaint   Patient presents with    Letter for School/Work     Pt reports wanting a work excuse because he did not go today so he could get some rest because his back is hurting.      34-year-old male presents to the emergency department requesting a work excuse.  He said that he felt like he needed to take the day off to rest because the muscles as arms were feeling tired after pulling chicken apart.  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  Denies any recent fall or injury.    The history is provided by the patient.     Review of patient's allergies indicates:  No Known Allergies  Past Medical History:   Diagnosis Date    HSV (herpes simplex virus) infection      No past surgical history on file.  No family history on file.  Social History[1]  Review of Systems   Constitutional:  Negative for chills and fever.   All other systems reviewed and are negative.      Physical Exam     Initial Vitals   BP Pulse Resp Temp SpO2   05/05/25 1231 05/05/25 1229 05/05/25 1229 05/05/25 1229 05/05/25 1229   119/73 65 16 98.5 °F (36.9 °C) 96 %      MAP       --                Physical Exam    Vitals reviewed.  Constitutional: He appears well-developed and well-nourished.   Neck: Neck supple.   Cardiovascular:  Normal rate and regular rhythm.           Pulmonary/Chest: Breath sounds normal.   Abdominal: Abdomen is soft. Bowel sounds are normal. He exhibits no distension and no mass. There is no abdominal tenderness. There is no rebound and no guarding.   Musculoskeletal:         General: Normal range of motion.      Right upper arm: Normal.      Left upper arm: Normal.      Right forearm: Normal.      Left forearm: Normal.      Cervical back: Neck supple.     Neurological: He is alert and oriented to person, place, and time. He has normal strength. GCS score is 15. GCS eye subscore is 4. GCS verbal subscore is 5. GCS motor subscore is 6.   Skin: Skin is  warm and dry. Capillary refill takes less than 2 seconds.   Psychiatric: He has a normal mood and affect.         Medical Screening Exam   See Full Note    ED Course   Procedures  Labs Reviewed - No data to display       Imaging Results    None          Medications - No data to display  Medical Decision Making  34-year-old male presents to the emergency department requesting a work excuse.  He said that he felt like he needed to take the day off to rest because the muscles as arms were feeling tired after pulling chicken apart.  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  Denies any recent fall or injury.  Diagnosis:  Arm pain  Prescribed Motrin    Risk  Prescription drug management.                                      Clinical Impression:   Final diagnoses:  [M79.601, M79.602] Pain in both upper extremities (Primary)        ED Disposition Condition    Discharge Stable          ED Prescriptions       Medication Sig Dispense Start Date End Date Auth. Provider    ibuprofen (ADVIL,MOTRIN) 800 MG tablet Take 1 tablet (800 mg total) by mouth 3 (three) times daily. for 5 days 15 tablet 5/5/2025 5/10/2025 Kelsey Ross FNP          Follow-up Information    None            [1]   Social History  Tobacco Use    Smoking status: Never    Smokeless tobacco: Never   Substance Use Topics    Alcohol use: Not Currently    Drug use: Not Currently        Kelsey Ross FNP  05/05/25 1241

## 2025-05-05 NOTE — Clinical Note
"Ney Wing" Yvonne was seen and treated in our emergency department on 5/5/2025.  He may return to work on 05/06/2025.       If you have any questions or concerns, please don't hesitate to call.      Kelsey Ross, KADI"

## 2025-05-07 ENCOUNTER — PATIENT OUTREACH (OUTPATIENT)
Facility: OTHER | Age: 35
End: 2025-05-07

## 2025-05-09 NOTE — PROGRESS NOTES
ED navigator third attempt to contact patient to assist with scheduling a post ED 7 day follow up with PCP. Unable to reach patient at this time or leave a message. ED navigator will close encounter at this time.    Angeles Kaur ED Navigator   [phone]

## 2025-05-19 ENCOUNTER — HOSPITAL ENCOUNTER (EMERGENCY)
Facility: HOSPITAL | Age: 35
Discharge: HOME | End: 2025-05-19
Payer: COMMERCIAL

## 2025-05-19 VITALS
RESPIRATION RATE: 17 BRPM | WEIGHT: 140 LBS | OXYGEN SATURATION: 97 % | BODY MASS INDEX: 22.5 KG/M2 | HEIGHT: 66 IN | TEMPERATURE: 98 F | HEART RATE: 66 BPM

## 2025-05-19 DIAGNOSIS — R53.83 FATIGUE, UNSPECIFIED TYPE: Primary | ICD-10-CM

## 2025-05-19 PROCEDURE — 99281 EMR DPT VST MAYX REQ PHY/QHP: CPT

## 2025-05-19 NOTE — Clinical Note
"Ney Wing" Yvonne was seen and treated in our emergency department on 5/19/2025.  He may return to work on 05/20/2025.       If you have any questions or concerns, please don't hesitate to call.      Gabe Wooten FNP"

## 2025-05-19 NOTE — ED PROVIDER NOTES
"Encounter Date: 5/19/2025       History     Chief Complaint   Patient presents with    Letter for School/Work     Pt reports being sore from working Saturday so he did not have time to rest. He reports wanting a letter for work.      34 year old male presents to the emergency department to be evaluated for generalized fatigue and shoulder "soreness" following work on Saturday.  He states that he only worked approximately 4.5 hours on Saturday, but that it was heavier lifting.  He needs a work excuse for today.  He works 0857-2807 daily and missed today.      The history is provided by the patient.     Review of patient's allergies indicates:  No Known Allergies  Past Medical History:   Diagnosis Date    HSV (herpes simplex virus) infection      History reviewed. No pertinent surgical history.  No family history on file.  Social History[1]  Review of Systems   Constitutional:  Positive for fatigue.   HENT: Negative.     Respiratory: Negative.     Cardiovascular: Negative.    Gastrointestinal: Negative.    Musculoskeletal:  Positive for arthralgias, back pain and myalgias. Negative for gait problem, joint swelling, neck pain and neck stiffness.   Skin: Negative.    Neurological: Negative.    Hematological: Negative.    Psychiatric/Behavioral: Negative.         Physical Exam     Initial Vitals [05/19/25 1143]   BP Pulse Resp Temp SpO2   -- 66 17 98 °F (36.7 °C) 97 %      MAP       --         Physical Exam    Vitals reviewed.  Constitutional: He appears well-developed and well-nourished. He is not diaphoretic. No distress.   HENT:   Head: Normocephalic and atraumatic.   Right Ear: External ear normal.   Left Ear: External ear normal.   Nose: Nose normal. Mouth/Throat: Oropharynx is clear and moist. No oropharyngeal exudate.   Eyes: EOM are normal. Pupils are equal, round, and reactive to light.   Neck: Neck supple. No tracheal deviation present. No JVD present.   Normal range of motion.  Cardiovascular:  Normal rate, " "regular rhythm and intact distal pulses.           Pulmonary/Chest: Breath sounds normal. No stridor. No respiratory distress.   Abdominal: Abdomen is soft. Bowel sounds are normal. He exhibits no distension. There is no abdominal tenderness.   Musculoskeletal:         General: No tenderness or edema. Normal range of motion.      Cervical back: Normal range of motion and neck supple.     Lymphadenopathy:     He has no cervical adenopathy.   Neurological: He is alert and oriented to person, place, and time. He has normal strength.   Skin: Skin is warm and dry. Capillary refill takes less than 2 seconds.   Psychiatric: He has a normal mood and affect. Thought content normal.         Medical Screening Exam   See Full Note    ED Course   Procedures  Labs Reviewed - No data to display       Imaging Results    None          Medications - No data to display  Medical Decision Making  34 year old male presents to the emergency department to be evaluated for generalized fatigue and shoulder "soreness" following work on Saturday.  He states that he only worked approximately 4.5 hours on Saturday, but that it was heavier lifting.  He needs a work excuse for today.  He works 4447-5292 daily and missed today.      Diagnosis: Fatigue  Encouraged rest and may take ibuprofen for muscle soreness.                                      Clinical Impression:   Final diagnoses:  [R53.83] Fatigue, unspecified type (Primary)        ED Disposition Condition    Discharge Stable          ED Prescriptions    None       Follow-up Information    None              [1]   Social History  Tobacco Use    Smoking status: Never    Smokeless tobacco: Never   Substance Use Topics    Alcohol use: Not Currently    Drug use: Not Currently        Gabe Wooten FNP  05/20/25 0005    "

## 2025-06-03 ENCOUNTER — HOSPITAL ENCOUNTER (EMERGENCY)
Facility: HOSPITAL | Age: 35
Discharge: HOME | End: 2025-06-03
Payer: COMMERCIAL

## 2025-06-03 VITALS
OXYGEN SATURATION: 96 % | WEIGHT: 145 LBS | SYSTOLIC BLOOD PRESSURE: 117 MMHG | BODY MASS INDEX: 22.76 KG/M2 | RESPIRATION RATE: 16 BRPM | TEMPERATURE: 99 F | HEART RATE: 71 BPM | DIASTOLIC BLOOD PRESSURE: 72 MMHG | HEIGHT: 67 IN

## 2025-06-03 DIAGNOSIS — M79.641 PAIN IN BOTH HANDS: Primary | ICD-10-CM

## 2025-06-03 DIAGNOSIS — M79.642 PAIN IN BOTH HANDS: Primary | ICD-10-CM

## 2025-06-03 PROCEDURE — 99281 EMR DPT VST MAYX REQ PHY/QHP: CPT

## 2025-06-03 NOTE — ED PROVIDER NOTES
Encounter Date: 6/3/2025       History     Chief Complaint   Patient presents with    Hand Pain    Letter for School/Work     Pt presents to the ed via pov w/ c/o hand cramping and for a work excuse. He states he feels like he cannot go to work because his hands hurt     34-year-old male presents to the emergency department to be evaluated for bilateral hand soreness.  He said that he really just needs a work excuse for today because he did not go to work due to his hands being sore.  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    The history is provided by the patient.     Review of patient's allergies indicates:  No Known Allergies  Past Medical History:   Diagnosis Date    HSV (herpes simplex virus) infection      History reviewed. No pertinent surgical history.  No family history on file.  Social History[1]  Review of Systems    Physical Exam     Initial Vitals [06/03/25 1149]   BP Pulse Resp Temp SpO2   117/72 71 16 98.8 °F (37.1 °C) 96 %      MAP       --         Physical Exam    Vitals reviewed.  Constitutional: He appears well-developed and well-nourished.   Neck: Neck supple.   Cardiovascular:  Normal rate and regular rhythm.           Pulses:       Radial pulses are 3+ on the right side and 3+ on the left side.   Pulmonary/Chest: Breath sounds normal.   Musculoskeletal:         General: Normal range of motion.      Right hand: Normal.      Left hand: Normal.      Cervical back: Neck supple.     Neurological: He is alert and oriented to person, place, and time. He has normal strength. GCS score is 15. GCS eye subscore is 4. GCS verbal subscore is 5. GCS motor subscore is 6.   Skin: Skin is warm and dry. Capillary refill takes less than 2 seconds.   Psychiatric: He has a normal mood and affect.         Medical Screening Exam   See Full Note    ED Course   Procedures  Labs Reviewed - No data to display       Imaging Results    None          Medications - No  data to display  Medical Decision Making  34-year-old male presents to the emergency department to be evaluated for bilateral hand soreness.  He said that he really just needs a work excuse for today because he did not go to work due to his hands being sore.  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  Diagnosis: Hand pain                                      Clinical Impression:   Final diagnoses:  [M79.641, M79.642] Pain in both hands (Primary)        ED Disposition Condition    Discharge Stable          ED Prescriptions    None       Follow-up Information    None            [1]   Social History  Tobacco Use    Smoking status: Never    Smokeless tobacco: Never   Substance Use Topics    Alcohol use: Not Currently    Drug use: Not Currently        Kelsey Ross FNP  06/03/25 3390

## 2025-06-03 NOTE — Clinical Note
"Ney Wing" Yvonne was seen and treated in our emergency department on 6/3/2025.  He may return to work on 06/04/2025.       If you have any questions or concerns, please don't hesitate to call.      Kelsey Ross, KADI"

## 2025-06-04 ENCOUNTER — PATIENT OUTREACH (OUTPATIENT)
Facility: OTHER | Age: 35
End: 2025-06-04

## 2025-06-04 NOTE — PROGRESS NOTES
6/4/25  Ed navigator first enrollment outreach attempt-unable to contact patient  Telephone message-We're sorry your call can't be completed at this time please hang up and try your call again later thank you  Ed navigator will follow up with patient on or around 6/9/25  Theresa Cole [facility]-Ed Navigator  Ph# [phone]      6/9/25  Ed navigator second enrollment outreach attempt-unable to contact patient  Ed navigator will close chart at this time  Theresa Cole [facility]-Ed Navigator  # [phone]

## 2025-06-16 ENCOUNTER — HOSPITAL ENCOUNTER (EMERGENCY)
Facility: HOSPITAL | Age: 35
Discharge: HOME | End: 2025-06-16
Payer: COMMERCIAL

## 2025-06-16 VITALS
WEIGHT: 146 LBS | SYSTOLIC BLOOD PRESSURE: 122 MMHG | HEIGHT: 67 IN | HEART RATE: 71 BPM | TEMPERATURE: 99 F | RESPIRATION RATE: 16 BRPM | BODY MASS INDEX: 22.91 KG/M2 | OXYGEN SATURATION: 97 % | DIASTOLIC BLOOD PRESSURE: 79 MMHG

## 2025-06-16 DIAGNOSIS — J06.9 VIRAL URI WITH COUGH: Primary | ICD-10-CM

## 2025-06-16 PROCEDURE — 99282 EMERGENCY DEPT VISIT SF MDM: CPT

## 2025-06-16 RX ORDER — CETIRIZINE HYDROCHLORIDE 10 MG/1
10 TABLET ORAL DAILY
Qty: 30 TABLET | Refills: 0 | Status: SHIPPED | OUTPATIENT
Start: 2025-06-16 | End: 2026-06-16

## 2025-06-16 RX ORDER — FLUTICASONE PROPIONATE 50 MCG
1 SPRAY, SUSPENSION (ML) NASAL 2 TIMES DAILY PRN
Qty: 15 G | Refills: 0 | Status: SHIPPED | OUTPATIENT
Start: 2025-06-16

## 2025-06-16 NOTE — ED PROVIDER NOTES
"Encounter Date: 6/16/2025       History     Chief Complaint   Patient presents with    Cough     Pt reports being "sick" he reports cough x3days.     34-year-old male presents to the emergency department to be evaluated for runny nose and cough that began 3 days ago.    The history is provided by the patient.   Cough  This is a new problem. Pertinent negatives include no chest pain, no chills, no sweats, no weight loss, no ear congestion, no ear pain, no headaches, no rhinorrhea, no sore throat, no myalgias, no shortness of breath, no wheezing and no eye redness.     Review of patient's allergies indicates:  No Known Allergies  Past Medical History:   Diagnosis Date    HSV (herpes simplex virus) infection      No past surgical history on file.  No family history on file.  Social History[1]  Review of Systems   Constitutional:  Negative for chills, fever and weight loss.   HENT:  Negative for ear pain, rhinorrhea and sore throat.    Eyes:  Negative for redness.   Respiratory:  Positive for cough. Negative for shortness of breath and wheezing.    Cardiovascular:  Negative for chest pain.   Musculoskeletal:  Negative for myalgias.   Neurological:  Negative for headaches.   All other systems reviewed and are negative.      Physical Exam     Initial Vitals   BP Pulse Resp Temp SpO2   06/16/25 1102 06/16/25 1101 06/16/25 1101 06/16/25 1101 06/16/25 1101   122/79 71 16 99.2 °F (37.3 °C) 97 %      MAP       --                Physical Exam    Vitals reviewed.  Constitutional: He appears well-developed and well-nourished.   Neck: Neck supple.   Cardiovascular:  Normal rate and regular rhythm.           Pulmonary/Chest: Breath sounds normal.   Abdominal: Abdomen is soft. Bowel sounds are normal. He exhibits no distension and no mass. There is no abdominal tenderness. There is no rebound and no guarding.   Musculoskeletal:      Cervical back: Neck supple.     Neurological: He is alert and oriented to person, place, and time. He " has normal strength. GCS score is 15. GCS eye subscore is 4. GCS verbal subscore is 5. GCS motor subscore is 6.   Skin: Skin is warm and dry. Capillary refill takes less than 2 seconds.   Psychiatric: He has a normal mood and affect.         Medical Screening Exam   See Full Note    ED Course   Procedures  Labs Reviewed - No data to display       Imaging Results    None          Medications - No data to display  Medical Decision Making  34-year-old male presents to the emergency department to be evaluated for runny nose and cough that began 3 days ago.  Diagnosis:  Viral URI with cough  Prescribed Flonase and Zyrtec    Risk  OTC drugs.                                      Clinical Impression:   Final diagnoses:  [J06.9] Viral URI with cough (Primary)        ED Disposition Condition    Discharge Stable          ED Prescriptions       Medication Sig Dispense Start Date End Date Auth. Provider    fluticasone propionate (FLONASE) 50 mcg/actuation nasal spray 1 spray (50 mcg total) by Each Nostril route 2 (two) times daily as needed. 15 g 6/16/2025 -- Kelsey Ross FNP    cetirizine (ZYRTEC) 10 MG tablet Take 1 tablet (10 mg total) by mouth once daily. 30 tablet 6/16/2025 6/16/2026 Kelsey Ross FNP          Follow-up Information    None            [1]   Social History  Tobacco Use    Smoking status: Never    Smokeless tobacco: Never   Substance Use Topics    Alcohol use: Not Currently    Drug use: Not Currently        Kelsey Ross FNP  06/16/25 8927

## 2025-06-16 NOTE — Clinical Note
"Ney Wing" Yvonne was seen and treated in our emergency department on 6/16/2025.  He may return to work on 06/17/2025.       If you have any questions or concerns, please don't hesitate to call.      Kelsey Ross, GENIP"

## 2025-06-17 ENCOUNTER — HOSPITAL ENCOUNTER (EMERGENCY)
Facility: HOSPITAL | Age: 35
Discharge: HOME | End: 2025-06-17
Payer: COMMERCIAL

## 2025-06-17 VITALS
WEIGHT: 142 LBS | HEIGHT: 67 IN | DIASTOLIC BLOOD PRESSURE: 73 MMHG | BODY MASS INDEX: 22.29 KG/M2 | OXYGEN SATURATION: 100 % | TEMPERATURE: 99 F | RESPIRATION RATE: 19 BRPM | SYSTOLIC BLOOD PRESSURE: 126 MMHG | HEART RATE: 69 BPM

## 2025-06-17 DIAGNOSIS — Z02.89 ENCOUNTER TO OBTAIN EXCUSE FROM WORK: Primary | ICD-10-CM

## 2025-06-17 PROCEDURE — 99281 EMR DPT VST MAYX REQ PHY/QHP: CPT

## 2025-06-17 NOTE — ED PROVIDER NOTES
"Encounter Date: 6/17/2025       History     Chief Complaint   Patient presents with    Letter for School/Work     Patient presents to the ED with c/o cough and nasal congestion. Patient states he needs a work excuse for today         Kelsey Villanueva FNP   Nurse Practitioner  Emergency Medicine     ED Provider Notes     Signed     Creation Time: 6/16/2025 11:37 AM   Related encounter: ED from 6/16/2025 in [facility] Emergency Department    Expand All Collapse All  Encounter Date: 6/16/2025          History       Chief Complaint  Patient presents with  · Cough      Pt reports being "sick" he reports cough x3days.     34-year-old male presents to the emergency department to be evaluated for runny nose and cough that began 3 days ago.     The history is provided by the patient.   Cough  This is a new problem. Pertinent negatives include no chest pain, no chills, no sweats, no weight loss, no ear congestion, no ear pain, no headaches, no rhinorrhea, no sore throat, no myalgias, no shortness of breath, no wheezing and no eye redness.      Review of patient's allergies indicates:  No Known Allergies    Past Medical History:  Diagnosis Date  · HSV (herpes simplex virus) infection       No past surgical history on file.  No family history on file.  [Social History]    [Social History]    Tobacco Use  · Smoking status: Never  · Smokeless tobacco: Never  Substance Use Topics  · Alcohol use: Not Currently  · Drug use: Not Currently    Review of Systems   Constitutional:  Negative for chills, fever and weight loss.   HENT:  Negative for ear pain, rhinorrhea and sore throat.    Eyes:  Negative for redness.   Respiratory:  Positive for cough. Negative for shortness of breath and wheezing.    Cardiovascular:  Negative for chest pain.   Musculoskeletal:  Negative for myalgias.   Neurological:  Negative for headaches.   All other systems reviewed and are negative.          Physical Exam       Initial " Vitals  BP Pulse Resp Temp SpO2  06/16/25 1102 06/16/25 1101 06/16/25 1101 06/16/25 1101 06/16/25 1101  122/79 71 16 99.2 °F (37.3 °C) 97 %     MAP          --                        Physical Exam     Vitals reviewed.  Constitutional: He appears well-developed and well-nourished.   Neck: Neck supple.   Cardiovascular:  Normal rate and regular rhythm.           Pulmonary/Chest: Breath sounds normal.   Abdominal: Abdomen is soft. Bowel sounds are normal. He exhibits no distension and no mass. There is no abdominal tenderness. There is no rebound and no guarding.   Musculoskeletal:      Cervical back: Neck supple.      Neurological: He is alert and oriented to person, place, and time. He has normal strength. GCS score is 15. GCS eye subscore is 4. GCS verbal subscore is 5. GCS motor subscore is 6.   Skin: Skin is warm and dry. Capillary refill takes less than 2 seconds.   Psychiatric: He has a normal mood and affect.              Medical Screening Exam  See Full Note       ED Course  Procedures  Labs Reviewed - No data to display          Imaging Results   None             Medications - No data to display  Medical Decision Making  34-year-old male presents to the emergency department to be evaluated for runny nose and cough that began 3 days ago.  Diagnosis:  Viral URI with cough  Prescribed Flonase and Zyrtec     Risk  OTC drugs.               Plan                           Clinical Impression:  Final diagnoses:  [J06.9] Viral URI with cough (Primary)         ED Disposition Condition    Discharge Stable               ED Prescriptions          Medication Sig Dispense Start Date End Date Auth. Provider    fluticasone propionate (FLONASE) 50 mcg/actuation nasal spray 1 spray (50 mcg total) by Each Nostril route 2 (two) times daily as needed. 15 g 6/16/2025 -- Kelsey Ross, GENIP    cetirizine (ZYRTEC) 10 MG tablet Take 1 tablet (10 mg total) by mouth once daily. 30 tablet 6/16/2025 6/16/2026 Kelsey Ross,  [facility]               Follow-up Information   None             Kelsey Ross, P  06/16/25 1142                 Review of patient's allergies indicates:  No Known Allergies  Past Medical History:   Diagnosis Date    HSV (herpes simplex virus) infection      History reviewed. No pertinent surgical history.  No family history on file.  Social History[1]  Review of Systems   Constitutional:  Positive for fatigue. Negative for fever.   HENT: Negative.  Negative for sore throat.    Eyes: Negative.    Respiratory: Negative.  Negative for shortness of breath.    Cardiovascular: Negative.  Negative for chest pain.   Gastrointestinal: Negative.  Negative for nausea.   Endocrine: Negative.    Genitourinary: Negative.  Negative for dysuria.   Musculoskeletal: Negative.  Negative for back pain.   Skin: Negative.  Negative for rash.   Allergic/Immunologic: Negative.    Neurological: Negative.  Negative for weakness.   Hematological: Negative.  Does not bruise/bleed easily.   Psychiatric/Behavioral: Negative.     All other systems reviewed and are negative.      Physical Exam     Initial Vitals   BP Pulse Resp Temp SpO2   06/17/25 0755 06/17/25 0754 06/17/25 0754 06/17/25 0754 06/17/25 0754   126/73 69 19 98.6 °F (37 °C) 100 %      MAP       --                Physical Exam    Constitutional: He appears well-developed and well-nourished.   HENT:   Head: Normocephalic and atraumatic.   Right Ear: External ear normal.   Left Ear: External ear normal.   Nose: Nose normal. Mouth/Throat: Oropharynx is clear and moist.   Eyes: Conjunctivae and EOM are normal. Pupils are equal, round, and reactive to light.   Neck: Neck supple.   Normal range of motion.  Cardiovascular:  Normal rate, regular rhythm, normal heart sounds and intact distal pulses.           Pulmonary/Chest: Breath sounds normal.   Abdominal: Abdomen is soft. Bowel sounds are normal.   Genitourinary:    Prostate and penis normal.     Musculoskeletal:         General:  "Normal range of motion.      Cervical back: Normal range of motion and neck supple.     Neurological: He is alert and oriented to person, place, and time. He has normal strength and normal reflexes.   Skin: Skin is warm and dry.   Psychiatric: He has a normal mood and affect. His behavior is normal. Judgment and thought content normal.         Medical Screening Exam   See Full Note    ED Course   Procedures  Labs Reviewed - No data to display       Imaging Results    None          Medications - No data to display  Medical Decision Making                        Medical Decision Making:   Initial Assessment:      Kelsey Villanueva FNP   Nurse Practitioner  Emergency Medicine     ED Provider Notes     Signed     Creation Time: 6/16/2025 11:37 AM   Related encounter: ED from 6/16/2025 in [facility] Emergency [facility]    Expand All Collapse All  Encounter Date: 6/16/2025          History       Chief Complaint  Patient presents with  · Cough      Pt reports being "sick" he reports cough x3days.     34-year-old male presents to the emergency department to be evaluated for runny nose and cough that began 3 days ago.     The history is provided by the patient.   Cough  This is a new problem. Pertinent negatives include no chest pain, no chills, no sweats, no weight loss, no ear congestion, no ear pain, no headaches, no rhinorrhea, no sore throat, no myalgias, no shortness of breath, no wheezing and no eye redness.      Review of patient's allergies indicates:  No Known Allergies    Past Medical History:  Diagnosis Date  · HSV (herpes simplex virus) infection       No past surgical history on file.  No family history on file.  [Social History]    [Social History]    Tobacco Use  · Smoking status: Never  · Smokeless tobacco: Never  Substance Use Topics  · Alcohol use: Not Currently  · Drug use: Not Currently    Review of Systems   Constitutional:  Negative for chills, fever and weight loss.   HENT:  " Negative for ear pain, rhinorrhea and sore throat.    Eyes:  Negative for redness.   Respiratory:  Positive for cough. Negative for shortness of breath and wheezing.    Cardiovascular:  Negative for chest pain.   Musculoskeletal:  Negative for myalgias.   Neurological:  Negative for headaches.   All other systems reviewed and are negative.          Physical Exam       Initial Vitals  BP Pulse Resp Temp SpO2  06/16/25 1102 06/16/25 1101 06/16/25 1101 06/16/25 1101 06/16/25 1101  122/79 71 16 99.2 °F (37.3 °C) 97 %     MAP          --                        Physical Exam     Vitals reviewed.  Constitutional: He appears well-developed and well-nourished.   Neck: Neck supple.   Cardiovascular:  Normal rate and regular rhythm.           Pulmonary/Chest: Breath sounds normal.   Abdominal: Abdomen is soft. Bowel sounds are normal. He exhibits no distension and no mass. There is no abdominal tenderness. There is no rebound and no guarding.   Musculoskeletal:      Cervical back: Neck supple.      Neurological: He is alert and oriented to person, place, and time. He has normal strength. GCS score is 15. GCS eye subscore is 4. GCS verbal subscore is 5. GCS motor subscore is 6.   Skin: Skin is warm and dry. Capillary refill takes less than 2 seconds.   Psychiatric: He has a normal mood and affect.              Medical Screening Exam  See Full Note       ED Course  Procedures  Labs Reviewed - No data to display          Imaging Results   None             Medications - No data to display  Medical Decision Making  34-year-old male presents to the emergency department to be evaluated for runny nose and cough that began 3 days ago.  Diagnosis:  Viral URI with cough  Prescribed Flonase and Zyrtec     Risk  OTC drugs.               Plan                           Clinical Impression:  Final diagnoses:  [J06.9] Viral URI with cough (Primary)         ED Disposition Condition    Discharge Stable               ED Prescriptions           Medication Sig Dispense Start Date End Date Auth. Provider    fluticasone propionate (FLONASE) 50 mcg/actuation nasal spray 1 spray (50 mcg total) by Each Nostril route 2 (two) times daily as needed. 15 g 6/16/2025 -- Kelsey Ross FNP    cetirizine (ZYRTEC) 10 MG tablet Take 1 tablet (10 mg total) by mouth once daily. 30 tablet 6/16/2025 6/16/2026 Kelsey Ross FNP               Follow-up Information   None             Kelsey Ross FNP  06/16/25 1142                 Differential Diagnosis:   Upper respiratory tract infection and cough cold symptoms.  ED Management:  In for work excuse             Clinical Impression:   Final diagnoses:  [Z02.89] Encounter to obtain excuse from work (Primary)        ED Disposition Condition    Discharge Stable          ED Prescriptions    None       Follow-up Information    None            [1]   Social History  Tobacco Use    Smoking status: Never    Smokeless tobacco: Never   Substance Use Topics    Alcohol use: Not Currently    Drug use: Not Currently        Zane Polanco,   06/17/25 0816

## 2025-06-17 NOTE — Clinical Note
"Ney Wing" Yvonne was seen and treated in our emergency department on 6/17/2025.  He may return to work on 06/18/2025.       If you have any questions or concerns, please don't hesitate to call.      Zane Polanco, DO"

## 2025-06-18 ENCOUNTER — PATIENT OUTREACH (OUTPATIENT)
Facility: OTHER | Age: 35
End: 2025-06-18

## 2025-06-18 NOTE — PROGRESS NOTES
6/18/25  Ed navigator first enrollment outreach attempt-unable to contact patient  Theresa Cole [facility]-Ed Navigator  Ph# [phone]      6/20/25  Ed navigator second enrollment outreach attempt-unable to contact patient  Ed navigator will close the chart at this time  Theresa Cole [facility]-Ed Navigator  # [phone]

## 2025-06-23 ENCOUNTER — HOSPITAL ENCOUNTER (EMERGENCY)
Facility: HOSPITAL | Age: 35
Discharge: HOME | End: 2025-06-23
Payer: COMMERCIAL

## 2025-06-23 VITALS
OXYGEN SATURATION: 97 % | TEMPERATURE: 98 F | HEIGHT: 67 IN | WEIGHT: 137 LBS | DIASTOLIC BLOOD PRESSURE: 65 MMHG | BODY MASS INDEX: 21.5 KG/M2 | RESPIRATION RATE: 14 BRPM | SYSTOLIC BLOOD PRESSURE: 138 MMHG | HEART RATE: 67 BPM

## 2025-06-23 DIAGNOSIS — Z02.89 ENCOUNTER TO OBTAIN EXCUSE FROM WORK: Primary | ICD-10-CM

## 2025-06-23 PROCEDURE — 99281 EMR DPT VST MAYX REQ PHY/QHP: CPT

## 2025-06-23 NOTE — Clinical Note
"Ney Wing" Yvonne was seen and treated in our emergency department on 6/23/2025.  He may return to work on 06/24/2025.       If you have any questions or concerns, please don't hesitate to call.      Kelsey Ross, GENIP"

## 2025-06-23 NOTE — ED PROVIDER NOTES
Encounter Date: 6/23/2025       History     Chief Complaint   Patient presents with    Headache     Pt presents to ed with c/o having headaches since Saturday. Has not taken anything or tried to be seen      34-year-old male presents to the emergency department requesting an excuse to return to work tomorrow.  He initially said he had a headache, then he said he did not have a headache and really just needed in his excuse to return to work tomorrow.  Denies any recent fall, head injury, sinus congestion, fever, chills.    The history is provided by the patient.     Review of patient's allergies indicates:  No Known Allergies  Past Medical History:   Diagnosis Date    HSV (herpes simplex virus) infection      No past surgical history on file.  No family history on file.  Social History[1]  Review of Systems   Constitutional:  Negative for chills and fever.   Neurological:  Negative for headaches.   All other systems reviewed and are negative.      Physical Exam     Initial Vitals [06/23/25 1101]   BP Pulse Resp Temp SpO2   138/65 67 14 98 °F (36.7 °C) 97 %      MAP       --         Physical Exam    Vitals reviewed.  Constitutional: He appears well-developed and well-nourished.   Neck: Neck supple.   Cardiovascular:  Normal rate and regular rhythm.           Pulmonary/Chest: Breath sounds normal.   Abdominal: Abdomen is soft. Bowel sounds are normal. He exhibits no distension and no mass. There is no abdominal tenderness. There is no rebound and no guarding.   Musculoskeletal:         General: Normal range of motion.      Cervical back: Neck supple.     Neurological: He is alert and oriented to person, place, and time. GCS score is 15. GCS eye subscore is 4. GCS verbal subscore is 5. GCS motor subscore is 6.   Skin: Skin is warm and dry. Capillary refill takes less than 2 seconds.   Psychiatric: He has a normal mood and affect.         Medical Screening Exam   See Full Note    ED Course   Procedures  Labs Reviewed -  No data to display       Imaging Results    None          Medications - No data to display  Medical Decision Making  34-year-old male presents to the emergency department requesting an excuse to return to work tomorrow.  He initially said he had a headache, then he said he did not have a headache and really just needed in his excuse to return to work tomorrow.  Denies any recent fall, head injury, sinus congestion, fever, chills.  Diagnosis: Encounter for work excuse                                      Clinical Impression:   Final diagnoses:  [Z02.89] Encounter to obtain excuse from work (Primary)        ED Disposition Condition    Discharge Stable          ED Prescriptions    None       Follow-up Information    None            [1]   Social History  Tobacco Use    Smoking status: Never    Smokeless tobacco: Never   Substance Use Topics    Alcohol use: Not Currently    Drug use: Not Currently        Kelsey Ross, [facility]  06/23/25 1102

## 2025-06-24 ENCOUNTER — HOSPITAL ENCOUNTER (EMERGENCY)
Facility: HOSPITAL | Age: 35
Discharge: HOME | End: 2025-06-24
Payer: COMMERCIAL

## 2025-06-24 VITALS
SYSTOLIC BLOOD PRESSURE: 149 MMHG | TEMPERATURE: 99 F | HEIGHT: 67 IN | BODY MASS INDEX: 22.91 KG/M2 | RESPIRATION RATE: 18 BRPM | DIASTOLIC BLOOD PRESSURE: 93 MMHG | HEART RATE: 89 BPM | OXYGEN SATURATION: 98 % | WEIGHT: 146 LBS

## 2025-06-24 DIAGNOSIS — S06.0X1A CONCUSSION WITH LOSS OF CONSCIOUSNESS OF 30 MINUTES OR LESS, INITIAL ENCOUNTER: Primary | ICD-10-CM

## 2025-06-24 DIAGNOSIS — M54.50 LOW BACK PAIN WITHOUT SCIATICA, UNSPECIFIED BACK PAIN LATERALITY, UNSPECIFIED CHRONICITY: ICD-10-CM

## 2025-06-24 DIAGNOSIS — S49.91XA INJURY OF RIGHT SHOULDER: ICD-10-CM
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
## 2025-06-24 DIAGNOSIS — S16.1XXA CERVICAL STRAIN, ACUTE, INITIAL ENCOUNTER: ICD-10-CM

## 2025-06-24 LAB
ALBUMIN SERPL BCP-MCNC: 4 G/DL (ref 3.5–5)
ALBUMIN/GLOB SERPL: 1.2 {RATIO}
ALP SERPL-CCNC: 53 U/L (ref 40–150)
ALT SERPL W P-5'-P-CCNC: 19 U/L
ANION GAP SERPL CALCULATED.3IONS-SCNC: 12 MMOL/L (ref 7–16)
AST SERPL W P-5'-P-CCNC: 23 U/L (ref 11–45)
BASOPHILS # BLD AUTO: 0.02 K/UL (ref 0–0.2)
BASOPHILS NFR BLD AUTO: 0.5 % (ref 0–1)
BILIRUB SERPL-MCNC: 0.4 MG/DL
BILIRUB UR QL STRIP: NEGATIVE
BUN SERPL-MCNC: 18 MG/DL (ref 9–21)
BUN/CREAT SERPL: 14 (ref 6–20)
CALCIUM SERPL-MCNC: 9.1 MG/DL (ref 8.4–10.2)
CHLORIDE SERPL-SCNC: 103 MMOL/L (ref 98–107)
CLARITY UR: CLEAR
CO2 SERPL-SCNC: 28 MMOL/L (ref 22–29)
COLOR UR: YELLOW
CREAT SERPL-MCNC: 1.33 MG/DL (ref 0.72–1.25)
DIFFERENTIAL METHOD BLD: ABNORMAL
EGFR (NO RACE VARIABLE) (RUSH/TITUS): 72 ML/MIN/1.73M2
EOSINOPHIL # BLD AUTO: 0.04 K/UL (ref 0–0.5)
EOSINOPHIL NFR BLD AUTO: 1.1 % (ref 1–4)
EOSINOPHIL NFR BLD MANUAL: 3 % (ref 1–4)
ERYTHROCYTE [DISTWIDTH] IN BLOOD BY AUTOMATED COUNT: 13 % (ref 11.5–14.5)
GLOBULIN SER-MCNC: 3.3 G/DL (ref 2–4)
GLUCOSE SERPL-MCNC: 92 MG/DL (ref 74–100)
GLUCOSE UR STRIP-MCNC: NEGATIVE MG/DL
HCT VFR BLD AUTO: 46.7 % (ref 40–54)
HGB BLD-MCNC: 15.1 G/DL (ref 13.5–18)
KETONES UR STRIP-SCNC: NEGATIVE MG/DL
LEUKOCYTE ESTERASE UR QL STRIP: NEGATIVE
LYMPHOCYTES # BLD AUTO: 0.96 K/UL (ref 1–4.8)
LYMPHOCYTES NFR BLD AUTO: 25.7 % (ref 27–41)
LYMPHOCYTES NFR BLD MANUAL: 23 % (ref 27–41)
MCH RBC QN AUTO: 26.2 PG (ref 27–31)
MCHC RBC AUTO-ENTMCNC: 32.3 G/DL (ref 32–36)
MCV RBC AUTO: 81.1 FL (ref 80–96)
MONOCYTES # BLD AUTO: 0.54 K/UL (ref 0–0.8)
MONOCYTES NFR BLD AUTO: 14.4 % (ref 2–6)
MONOCYTES NFR BLD MANUAL: 14 % (ref 2–6)
MPC BLD CALC-MCNC: 10 FL (ref 9.4–12.4)
NEUTROPHILS # BLD AUTO: 2.18 K/UL (ref 1.8–7.7)
NEUTROPHILS NFR BLD AUTO: 58.3 % (ref 53–65)
NEUTS SEG NFR BLD MANUAL: 60 % (ref 50–62)
NITRITE UR QL STRIP: NEGATIVE
NRBC BLD MANUAL-RTO: ABNORMAL %
PH UR STRIP: 6 PH UNITS
PLATELET # BLD AUTO: 225 K/UL (ref 150–400)
POTASSIUM SERPL-SCNC: 4 MMOL/L (ref 3.5–5.1)
PROT SERPL-MCNC: 7.3 G/DL (ref 6.4–8.3)
PROT UR QL STRIP: NEGATIVE
RBC # BLD AUTO: 5.76 M/UL (ref 4.6–6.2)
RBC # UR STRIP: NEGATIVE /UL
SODIUM SERPL-SCNC: 139 MMOL/L (ref 136–145)
SP GR UR STRIP: 1.02
UROBILINOGEN UR STRIP-ACNC: 1 MG/DL
WBC # BLD AUTO: 3.74 K/UL (ref 4.5–11)

## 2025-06-24 PROCEDURE — 99285 EMERGENCY DEPT VISIT HI MDM: CPT | Mod: 25

## 2025-06-24 PROCEDURE — G0390 TRAUMA RESPONS W/HOSP CRITI: HCPCS

## 2025-06-24 PROCEDURE — 63600175 PHARM REV CODE 636 W HCPCS: Mod: JZ,TB | Performed by: NURSE PRACTITIONER

## 2025-06-24 PROCEDURE — 99284 EMERGENCY DEPT VISIT MOD MDM: CPT | Mod: ,,, | Performed by: NURSE PRACTITIONER

## 2025-06-24 PROCEDURE — 36415 COLL VENOUS BLD VENIPUNCTURE: CPT | Performed by: NURSE PRACTITIONER

## 2025-06-24 PROCEDURE — 96372 THER/PROPH/DIAG INJ SC/IM: CPT | Performed by: NURSE PRACTITIONER

## 2025-06-24 PROCEDURE — 85025 COMPLETE CBC W/AUTO DIFF WBC: CPT | Performed by: NURSE PRACTITIONER

## 2025-06-24 PROCEDURE — 81003 URINALYSIS AUTO W/O SCOPE: CPT | Performed by: NURSE PRACTITIONER

## 2025-06-24 PROCEDURE — 80053 COMPREHEN METABOLIC PANEL: CPT | Performed by: NURSE PRACTITIONER

## 2025-06-24 RX ORDER — CYCLOBENZAPRINE HCL 10 MG
10 TABLET ORAL 3 TIMES DAILY PRN
Qty: 15 TABLET | Refills: 0 | Status: SHIPPED | OUTPATIENT
Start: 2025-06-24 | End: 2025-06-29

## 2025-06-24 RX ORDER — KETOROLAC TROMETHAMINE 30 MG/ML
15 INJECTION, SOLUTION INTRAMUSCULAR; INTRAVENOUS
Status: COMPLETED | OUTPATIENT
Start: 2025-06-24 | End: 2025-06-24

## 2025-06-24 RX ORDER — NAPROXEN 500 MG/1
500 TABLET ORAL 2 TIMES DAILY WITH MEALS
Qty: 20 TABLET | Refills: 0 | Status: SHIPPED | OUTPATIENT
Start: 2025-06-24

## 2025-06-24 RX ADMIN — KETOROLAC TROMETHAMINE 15 MG: 30 INJECTION, SOLUTION INTRAMUSCULAR at 08:06

## 2025-06-24 NOTE — Clinical Note
"Ney Wing" Yvonne was seen and treated in our emergency department on 6/24/2025.  He may return to work on 06/26/2025.       If you have any questions or concerns, please don't hesitate to call.      Laura Lepe, GENIP"
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
## 2025-06-25 ENCOUNTER — HOSPITAL ENCOUNTER (EMERGENCY)
Facility: HOSPITAL | Age: 35
Discharge: HOME | End: 2025-06-25
Payer: COMMERCIAL

## 2025-06-25 VITALS
OXYGEN SATURATION: 99 % | BODY MASS INDEX: 22.91 KG/M2 | RESPIRATION RATE: 18 BRPM | HEART RATE: 69 BPM | HEIGHT: 67 IN | DIASTOLIC BLOOD PRESSURE: 80 MMHG | WEIGHT: 146 LBS | SYSTOLIC BLOOD PRESSURE: 144 MMHG | TEMPERATURE: 99 F
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
## 2025-06-25 DIAGNOSIS — S39.012D BACK STRAIN, SUBSEQUENT ENCOUNTER: Primary | ICD-10-CM

## 2025-06-25 PROCEDURE — 63600175 PHARM REV CODE 636 W HCPCS: Performed by: NURSE PRACTITIONER

## 2025-06-25 PROCEDURE — 96372 THER/PROPH/DIAG INJ SC/IM: CPT | Performed by: NURSE PRACTITIONER

## 2025-06-25 PROCEDURE — 99285 EMERGENCY DEPT VISIT HI MDM: CPT | Mod: 25

## 2025-06-25 RX ORDER — KETOROLAC TROMETHAMINE 30 MG/ML
30 INJECTION, SOLUTION INTRAMUSCULAR; INTRAVENOUS
Status: COMPLETED | OUTPATIENT
Start: 2025-06-25 | End: 2025-06-25

## 2025-06-25 RX ORDER — ORPHENADRINE CITRATE 30 MG/ML
60 INJECTION INTRAMUSCULAR; INTRAVENOUS
Status: COMPLETED | OUTPATIENT
Start: 2025-06-25 | End: 2025-06-25

## 2025-06-25 RX ADMIN — ORPHENADRINE CITRATE 60 MG: 30 INJECTION, SOLUTION INTRAMUSCULAR; INTRAVENOUS at 01:06

## 2025-06-25 RX ADMIN — KETOROLAC TROMETHAMINE 30 MG: 30 INJECTION, SOLUTION INTRAMUSCULAR at 01:06
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
## 2025-06-25 NOTE — DISCHARGE INSTRUCTIONS
Fill the prescriptions for naproxen and Flexeril that you were given yesterday and take as directed.  Follow up with your primary care provider in 2 days. Return to the emergency department for any increase in symptoms or for any other new or worrisome symptoms.

## 2025-06-25 NOTE — DISCHARGE INSTRUCTIONS
-Follow up with primary care provider in 1-2 days.  Wake every 2-3 hours and perform a task you would typically perform when waking such as brushing teeth or making a glass of water.   Avoid excessive use of electronics for the next 24-48 hours.  Return to emergency department immediately if there is any difficulty waking or performing normal tasks on waking, weakness, confusion, vomiting, slurred speech or any other worrisome or concerning symptoms.      -Take Naproxen 500 mg one tab by mouth twice a day as needed for pain/inflammation. Do not take Ibuprofen, Motrin, Advil or Aleve while on Naproxen. May take Tylenol 500 mg 2 tabs by mouth every 6 hours in between Naproxen if needed for pain.     -Take Flexeril 10 mg one tab by mouth every 8 hours as needed for muscle spasms. Do not take while working or driving due to will cause drowsiness.     -Ice pack on 10-15 minutes, then off 30 minutes. May alternate with moist heat.

## 2025-06-25 NOTE — Clinical Note
"Ney Wing" Yvonne was seen and treated in our emergency department on 6/25/2025.  He may return to work on 07/01/2025.       If you have any questions or concerns, please don't hesitate to call.      Chung AYALA RN    "

## 2025-06-26 ENCOUNTER — TELEPHONE (OUTPATIENT)
Dept: EMERGENCY MEDICINE | Facility: HOSPITAL | Age: 35
End: 2025-06-26
Payer: COMMERCIAL

## 2025-06-27 ENCOUNTER — HOSPITAL ENCOUNTER (EMERGENCY)
Facility: HOSPITAL | Age: 35
Discharge: HOME | End: 2025-06-27
Payer: COMMERCIAL

## 2025-06-27 VITALS
OXYGEN SATURATION: 96 % | TEMPERATURE: 98 F | BODY MASS INDEX: 22.91 KG/M2 | RESPIRATION RATE: 17 BRPM | HEART RATE: 73 BPM | SYSTOLIC BLOOD PRESSURE: 134 MMHG | DIASTOLIC BLOOD PRESSURE: 70 MMHG | HEIGHT: 67 IN | WEIGHT: 146 LBS
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
## 2025-06-27 DIAGNOSIS — M54.50 ACUTE MIDLINE LOW BACK PAIN WITHOUT SCIATICA: Primary | ICD-10-CM

## 2025-06-27 PROCEDURE — 96372 THER/PROPH/DIAG INJ SC/IM: CPT | Performed by: NURSE PRACTITIONER

## 2025-06-27 PROCEDURE — 99284 EMERGENCY DEPT VISIT MOD MDM: CPT | Mod: 25

## 2025-06-27 PROCEDURE — 63600175 PHARM REV CODE 636 W HCPCS: Mod: JZ,TB | Performed by: NURSE PRACTITIONER

## 2025-06-27 RX ORDER — MORPHINE SULFATE 4 MG/ML
4 INJECTION, SOLUTION INTRAMUSCULAR; INTRAVENOUS
Refills: 0 | Status: COMPLETED | OUTPATIENT
Start: 2025-06-27 | End: 2025-06-27

## 2025-06-27 RX ORDER — HYDROCODONE BITARTRATE AND ACETAMINOPHEN 5; 325 MG/1; MG/1
1 TABLET ORAL EVERY 6 HOURS PRN
Qty: 12 TABLET | Refills: 0 | Status: SHIPPED | OUTPATIENT
Start: 2025-06-27

## 2025-06-27 RX ORDER — METHYLPREDNISOLONE ACETATE 80 MG/ML
80 INJECTION, SUSPENSION INTRA-ARTICULAR; INTRALESIONAL; INTRAMUSCULAR; SOFT TISSUE
Status: COMPLETED | OUTPATIENT
Start: 2025-06-27 | End: 2025-06-27

## 2025-06-27 RX ADMIN — MORPHINE SULFATE 4 MG: 4 INJECTION INTRAVENOUS at 01:06

## 2025-06-27 RX ADMIN — METHYLPREDNISOLONE ACETATE 80 MG: 80 INJECTION, SUSPENSION INTRA-ARTICULAR; INTRALESIONAL; INTRAMUSCULAR; SOFT TISSUE at 01:06

## 2025-06-27 NOTE — ED NOTES
06/27/2025 03:31 pm   ED Chart with referral and consult referral form has been faxed to [facility].  Pt has been referred to see Dr. Simpson in clinic.
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
## 2025-06-27 NOTE — Clinical Note
"Ney Wing" Yvonne was seen and treated in our emergency department on 6/27/2025.  He may return to work on 06/28/2025.       If you have any questions or concerns, please don't hesitate to call.      Mary Lou Pascual, GENIP"

## 2025-07-01 ENCOUNTER — HOSPITAL ENCOUNTER (EMERGENCY)
Facility: HOSPITAL | Age: 35
Discharge: HOME | End: 2025-07-01
Payer: COMMERCIAL

## 2025-07-01 VITALS
OXYGEN SATURATION: 98 % | HEART RATE: 64 BPM | RESPIRATION RATE: 18 BRPM | BODY MASS INDEX: 21.69 KG/M2 | HEIGHT: 66 IN | WEIGHT: 135 LBS | TEMPERATURE: 98 F

## 2025-07-01 DIAGNOSIS — F41.9 ANXIETY: Primary | ICD-10-CM

## 2025-07-01 PROCEDURE — 99281 EMR DPT VST MAYX REQ PHY/QHP: CPT
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
## 2025-07-01 NOTE — Clinical Note
"Ney Wing" Yvonne was seen and treated in our emergency department on 7/1/2025.  He may return to work on 07/02/2025.       If you have any questions or concerns, please don't hesitate to call.      Kelsey Ross, KADI"

## 2025-07-09 ENCOUNTER — HOSPITAL ENCOUNTER (EMERGENCY)
Facility: HOSPITAL | Age: 35
Discharge: HOME | End: 2025-07-09
Payer: COMMERCIAL

## 2025-07-09 VITALS
SYSTOLIC BLOOD PRESSURE: 144 MMHG | HEART RATE: 75 BPM | TEMPERATURE: 98 F | WEIGHT: 135 LBS | BODY MASS INDEX: 21.69 KG/M2 | OXYGEN SATURATION: 98 % | DIASTOLIC BLOOD PRESSURE: 97 MMHG | RESPIRATION RATE: 18 BRPM | HEIGHT: 66 IN

## 2025-07-09 DIAGNOSIS — M54.50 ACUTE BILATERAL LOW BACK PAIN WITHOUT SCIATICA: Primary | ICD-10-CM

## 2025-07-09 PROCEDURE — 99284 EMERGENCY DEPT VISIT MOD MDM: CPT

## 2025-07-09 RX ORDER — BACLOFEN 10 MG/1
10 TABLET ORAL 3 TIMES DAILY
Qty: 15 TABLET | Refills: 0 | Status: SHIPPED | OUTPATIENT
Start: 2025-07-09 | End: 2025-07-14

## 2025-07-09 RX ORDER — NAPROXEN 500 MG/1
500 TABLET ORAL 2 TIMES DAILY WITH MEALS
Qty: 20 TABLET | Refills: 0 | Status: SHIPPED | OUTPATIENT
Start: 2025-07-09
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
## 2025-07-09 NOTE — Clinical Note
"Ney Wing" Yvonne was seen and treated in our emergency department on 7/9/2025.  He may return to work on 07/10/2025.       If you have any questions or concerns, please don't hesitate to call.      Federico YOU    "

## 2025-07-17 ENCOUNTER — PATIENT OUTREACH (OUTPATIENT)
Facility: OTHER | Age: 35
End: 2025-07-17
Payer: COMMERCIAL

## 2025-07-17 NOTE — PROGRESS NOTES
7/17/25  Ed navigator first enrollment outreach attempt-unable to contact patient  Theresa Cole [facility] Navigator  Ph# [phone]

## 2025-07-21 NOTE — PROGRESS NOTES
7/21/25  Ed navigator second ed enrollment outreach attempt-unable to contact patient  Ed navigator will close chart at this time  Theresa Cole [facility]-Ed Navigator  # [phone]